# Patient Record
Sex: FEMALE | Race: WHITE | NOT HISPANIC OR LATINO | Employment: OTHER | URBAN - METROPOLITAN AREA
[De-identification: names, ages, dates, MRNs, and addresses within clinical notes are randomized per-mention and may not be internally consistent; named-entity substitution may affect disease eponyms.]

---

## 2017-08-08 ENCOUNTER — OFFICE VISIT (OUTPATIENT)
Dept: INTERNAL MEDICINE | Facility: CLINIC | Age: 72
End: 2017-08-08

## 2017-08-08 VITALS
TEMPERATURE: 98.8 F | HEIGHT: 67 IN | OXYGEN SATURATION: 97 % | BODY MASS INDEX: 24.8 KG/M2 | WEIGHT: 158 LBS | DIASTOLIC BLOOD PRESSURE: 72 MMHG | SYSTOLIC BLOOD PRESSURE: 118 MMHG | HEART RATE: 72 BPM

## 2017-08-08 DIAGNOSIS — E78.2 MIXED HYPERLIPIDEMIA: Primary | ICD-10-CM

## 2017-08-08 DIAGNOSIS — Z12.31 ENCOUNTER FOR SCREENING MAMMOGRAM FOR MALIGNANT NEOPLASM OF BREAST: ICD-10-CM

## 2017-08-08 DIAGNOSIS — Z12.39 BREAST CANCER SCREENING: ICD-10-CM

## 2017-08-08 DIAGNOSIS — M81.0 OP (OSTEOPOROSIS): ICD-10-CM

## 2017-08-08 DIAGNOSIS — E55.9 AVITAMINOSIS D: ICD-10-CM

## 2017-08-08 PROCEDURE — 99214 OFFICE O/P EST MOD 30 MIN: CPT | Performed by: FAMILY MEDICINE

## 2017-08-08 PROCEDURE — G0438 PPPS, INITIAL VISIT: HCPCS | Performed by: FAMILY MEDICINE

## 2017-08-08 RX ORDER — DESONIDE 0.5 MG/G
CREAM TOPICAL 2 TIMES DAILY
Qty: 1 EACH | Refills: 0 | Status: SHIPPED | OUTPATIENT
Start: 2017-08-08 | End: 2018-02-27

## 2017-08-08 RX ORDER — DESONIDE 0.5 MG/G
CREAM TOPICAL 2 TIMES DAILY
COMMUNITY
End: 2017-08-08 | Stop reason: SDUPTHER

## 2017-08-11 ENCOUNTER — TELEPHONE (OUTPATIENT)
Dept: INTERNAL MEDICINE | Facility: CLINIC | Age: 72
End: 2017-08-11

## 2017-08-11 LAB
25(OH)D3+25(OH)D2 SERPL-MCNC: 70 NG/ML (ref 30–100)
ALBUMIN SERPL-MCNC: 4 G/DL (ref 3.5–5.2)
ALBUMIN/GLOB SERPL: 1.5 G/DL
ALP SERPL-CCNC: 78 U/L (ref 39–117)
ALT SERPL-CCNC: 23 U/L (ref 1–33)
AST SERPL-CCNC: 24 U/L (ref 1–32)
BILIRUB SERPL-MCNC: 0.5 MG/DL (ref 0.1–1.2)
BUN SERPL-MCNC: 22 MG/DL (ref 8–23)
BUN/CREAT SERPL: 25.3 (ref 7–25)
CALCIUM SERPL-MCNC: 9.4 MG/DL (ref 8.6–10.5)
CHLORIDE SERPL-SCNC: 105 MMOL/L (ref 98–107)
CHOLEST SERPL-MCNC: 276 MG/DL (ref 0–200)
CO2 SERPL-SCNC: 24.8 MMOL/L (ref 22–29)
CREAT SERPL-MCNC: 0.87 MG/DL (ref 0.57–1)
GLOBULIN SER CALC-MCNC: 2.6 GM/DL
GLUCOSE SERPL-MCNC: 93 MG/DL (ref 65–99)
HDLC SERPL-MCNC: 42 MG/DL (ref 40–60)
LDLC SERPL CALC-MCNC: 186 MG/DL (ref 0–100)
POTASSIUM SERPL-SCNC: 4.2 MMOL/L (ref 3.5–5.2)
PROT SERPL-MCNC: 6.6 G/DL (ref 6–8.5)
SODIUM SERPL-SCNC: 141 MMOL/L (ref 136–145)
TRIGL SERPL-MCNC: 238 MG/DL (ref 0–150)
VLDLC SERPL CALC-MCNC: 47.6 MG/DL (ref 5–40)

## 2017-08-11 NOTE — TELEPHONE ENCOUNTER
----- Message from Octavio Mixon MD sent at 8/11/2017  7:44 AM EDT -----  Elevated cholesterol recommend starting Lipitor 20 mg daily follow low-fat low-cholesterol diet and recheck fasting lipid profile, AST, ALT 3 months

## 2017-08-11 NOTE — TELEPHONE ENCOUNTER
Patient notified.  Patient stated that she did not want to take medication for this.  She will call the office if she changes her mind.  Dr. Mixon notified.

## 2017-08-22 ENCOUNTER — TELEPHONE (OUTPATIENT)
Dept: INTERNAL MEDICINE | Facility: CLINIC | Age: 72
End: 2017-08-22

## 2017-08-22 DIAGNOSIS — Z12.11 ENCOUNTER FOR SCREENING COLONOSCOPY: Primary | ICD-10-CM

## 2017-08-22 NOTE — TELEPHONE ENCOUNTER
Patient called stating that she was to be referred to GI - she has not heard anything from their office.  Please advise.

## 2017-08-23 NOTE — PROGRESS NOTES
Angelina Harris is a 72 y.o. female.  Seen 08/08/2017    Assessment/Plan   Problem List Items Addressed This Visit        Cardiovascular and Mediastinum    HLD (hyperlipidemia) - Primary    Relevant Orders    Lipid Panel (Completed)       Digestive    Avitaminosis D    Relevant Orders    Comprehensive Metabolic Panel (Completed)       Musculoskeletal and Integument    OP (osteoporosis)    Relevant Orders    Vitamin D 25 Hydroxy (Completed)    Comprehensive Metabolic Panel (Completed)           Follow-up results of blood work continue present management of chronic medical problems adjustments as dictated by labs  Subjective     Chief Complaint   Patient presents with   • Inital Medicare Wellness     Patient is requesting refill on desonide 0.05% cream   osteoporosis vitamin D deficiency hyperlipidemia  Social History   Substance Use Topics   • Smoking status: Never Smoker   • Smokeless tobacco: Never Used   • Alcohol use No       History of Present Illness   Patient comes in for follow-up of chronic medical problems as listed above with vitamin D deficiency hyperlipidemia and osteoporosis.  She presently has no acute concerns although needs laboratory evaluation of chronic issues she uses DesOwen steroid cream intermittently for eczematous patches should like a refill on that  The following portions of the patient's history were reviewed and updated as appropriate:PMHroutine: Social history , Past Medical History, Allergies, Current Medications, Active Problem List and Health Maintenance    Review of Systems   Constitutional: Negative for activity change, appetite change and unexpected weight change.   HENT: Negative for nosebleeds and trouble swallowing.    Eyes: Negative for pain and visual disturbance.   Respiratory: Negative for chest tightness, shortness of breath and wheezing.    Cardiovascular: Negative for chest pain and palpitations.   Gastrointestinal: Negative for abdominal pain and blood in stool.  "  Endocrine: Negative.    Genitourinary: Negative for difficulty urinating and hematuria.   Musculoskeletal: Negative for joint swelling.   Skin: Negative for color change and rash.   Allergic/Immunologic: Negative.    Neurological: Negative for syncope and speech difficulty.   Hematological: Negative for adenopathy.   Psychiatric/Behavioral: Negative for agitation and confusion.   All other systems reviewed and are negative.      Objective   Vitals:    08/08/17 1359   BP: 118/72   BP Location: Right arm   Patient Position: Sitting   Cuff Size: Adult   Pulse: 72   Temp: 98.8 °F (37.1 °C)   TempSrc: Oral   SpO2: 97%   Weight: 158 lb (71.7 kg)   Height: 67\" (170.2 cm)     Body mass index is 24.75 kg/(m^2).  Physical Exam   Constitutional: She is oriented to person, place, and time. She appears well-developed and well-nourished. No distress.   HENT:   Head: Normocephalic and atraumatic.   Eyes: Conjunctivae and EOM are normal. Pupils are equal, round, and reactive to light. Right eye exhibits no discharge. Left eye exhibits no discharge. No scleral icterus.   Neck: Normal range of motion. Neck supple. No tracheal deviation present. No thyromegaly present.   Cardiovascular: Normal rate, regular rhythm, normal heart sounds, intact distal pulses and normal pulses.  Exam reveals no gallop.    No murmur heard.  Pulmonary/Chest: Effort normal and breath sounds normal. No respiratory distress. She has no wheezes. She has no rales.   Musculoskeletal: Normal range of motion.   Neurological: She is alert and oriented to person, place, and time. She exhibits normal muscle tone. Coordination normal.   Skin: Skin is warm. No rash noted. No erythema. No pallor.   Psychiatric: She has a normal mood and affect. Her behavior is normal. Judgment and thought content normal.   Nursing note and vitals reviewed.    Reviewed Data:  Office Visit on 08/08/2017   Component Date Value Ref Range Status   • 25 Hydroxy, Vitamin D 08/10/2017 70.0  " 30.0 - 100.0 ng/mL Final    Comment: Reference Range for Total Vitamin D 25(OH)  Deficiency    <20.0 ng/mL  Insufficiency 21-29 ng/mL  Sufficiency    ng/mL  Toxicity      >100 ng/ml        • Glucose 08/10/2017 93  65 - 99 mg/dL Final   • BUN 08/10/2017 22  8 - 23 mg/dL Final   • Creatinine 08/10/2017 0.87  0.57 - 1.00 mg/dL Final   • eGFR Non  Am 08/10/2017 64  >60 mL/min/1.73 Final    Comment: The MDRD GFR formula is only valid for adults with stable  renal function between ages 18 and 70.     • eGFR  Am 08/10/2017 77  >60 mL/min/1.73 Final   • BUN/Creatinine Ratio 08/10/2017 25.3* 7.0 - 25.0 Final   • Sodium 08/10/2017 141  136 - 145 mmol/L Final   • Potassium 08/10/2017 4.2  3.5 - 5.2 mmol/L Final   • Chloride 08/10/2017 105  98 - 107 mmol/L Final   • Total CO2 08/10/2017 24.8  22.0 - 29.0 mmol/L Final   • Calcium 08/10/2017 9.4  8.6 - 10.5 mg/dL Final   • Total Protein 08/10/2017 6.6  6.0 - 8.5 g/dL Final   • Albumin 08/10/2017 4.00  3.50 - 5.20 g/dL Final   • Globulin 08/10/2017 2.6  gm/dL Final   • A/G Ratio 08/10/2017 1.5  g/dL Final   • Total Bilirubin 08/10/2017 0.5  0.1 - 1.2 mg/dL Final   • Alkaline Phosphatase 08/10/2017 78  39 - 117 U/L Final   • AST (SGOT) 08/10/2017 24  1 - 32 U/L Final   • ALT (SGPT) 08/10/2017 23  1 - 33 U/L Final   • Total Cholesterol 08/10/2017 276* 0 - 200 mg/dL Final   • Triglycerides 08/10/2017 238* 0 - 150 mg/dL Final   • HDL Cholesterol 08/10/2017 42  40 - 60 mg/dL Final   • VLDL Cholesterol 08/10/2017 47.6* 5 - 40 mg/dL Final   • LDL Cholesterol  08/10/2017 186* 0 - 100 mg/dL Final

## 2017-08-23 NOTE — PROGRESS NOTES
QUICK REFERENCE INFORMATION:  The ABCs of the Annual Wellness Visit    Initial Medicare Wellness Visit    HEALTH RISK ASSESSMENT    1945    Recent Hospitalizations:  No hospitalization(s) within the last year..        Current Medical Providers:  Patient Care Team:  Octavio Mixon MD as PCP - General (Family Medicine)        Smoking Status:  History   Smoking Status   • Never Smoker   Smokeless Tobacco   • Never Used       Alcohol Consumption:  History   Alcohol Use No       Depression Screen:   PHQ-9 Depression Screening 8/8/2017   Little interest or pleasure in doing things 0   Feeling down, depressed, or hopeless 0   Trouble falling or staying asleep, or sleeping too much 0   Feeling tired or having little energy 1   Poor appetite or overeating 0   Feeling bad about yourself - or that you are a failure or have let yourself or your family down 0   Trouble concentrating on things, such as reading the newspaper or watching television 0   Moving or speaking so slowly that other people could have noticed. Or the opposite - being so fidgety or restless that you have been moving around a lot more than usual 0   Thoughts that you would be better off dead, or of hurting yourself in some way 0   PHQ-9 Total Score 1   If you checked off any problems, how difficult have these problems made it for you to do your work, take care of things at home, or get along with other people? Not difficult at all       Health Habits and Functional and Cognitive Screening:  Functional & Cognitive Status 8/8/2017   Do you have difficulty preparing food and eating? No   Do you have difficulty bathing yourself? No   Do you have difficulty getting dressed? No   Do you have difficulty using the toilet? No   Do you have difficulty moving around from place to place? No   In the past year have you fallen or experienced a near fall? No   Do you need help using the phone?  No   Are you deaf or do you have serious difficulty hearing?  No   Do you  need help with transportation? No   Do you need help shopping? No   Do you need help preparing meals?  No   Do you need help with housework?  No   Do you need help with laundry? No   Do you need help taking your medications? No   Do you need help managing money? No   Do you have difficulty concentrating, remembering or making decisions? Yes       Health Habits  Current Diet: Well Balanced Diet  Dental Exam: Up to date  Eye Exam: Up to date  Exercise (times per week): 7 times per week  Current Exercise Activities Include: Walking          Does the patient have evidence of cognitive impairment? No    Asiprin use counseling: Does not need AS but is currently taking (discussed benefits vs risks and patient elects to stay on ASA)      Recent Lab Results:    Visual Acuity:  No exam data present    Age-appropriate Screening Schedule:  Refer to the list below for future screening recommendations based on patient's age, sex and/or medical conditions. Orders for these recommended tests are listed in the plan section. The patient has been provided with a written plan.    Health Maintenance   Topic Date Due   • TDAP/TD VACCINES (1 - Tdap) 08/07/1962   • PNEUMOCOCCAL VACCINES (65+ LOW/MEDIUM RISK) (1 of 2 - PCV13) 08/07/2008   • MAMMOGRAM  04/15/2016   • COLONOSCOPY  04/15/2016   • ZOSTER VACCINE  04/15/2016   • DXA SCAN  08/12/2016   • INFLUENZA VACCINE  09/01/2017   • LIPID PANEL  08/10/2018        Subjective   History of Present Illness    Angelina Harris is a 72 y.o. female who presents for an Annual Wellness Visit.    The following portions of the patient's history were reviewed and updated as appropriate: allergies, current medications, past family history, past medical history, past social history, past surgical history and problem list.    Outpatient Medications Prior to Visit   Medication Sig Dispense Refill   • Calcium Carbonate-Vitamin D (CALCIUM 500 + D PO) Take 1 tablet by mouth daily.     • Cholecalciferol (VITAMIN D)  "1000 UNITS capsule Take 1 capsule by mouth 2 (two) times a day.     • Flaxseed, Linseed, (FLAX SEED OIL PO) Take 1 capsule by mouth 2 (two) times a day.     • Glucosamine-Chondroit-Vit C-Mn (GLUCOSAMINE-CHONDROITIN) tablet Take 1 tablet by mouth 2 (two) times a day.     • Ibuprofen 200 MG capsule Take 2 capsules by mouth Daily.     • Multiple Vitamin (MULTIVITAMIN) capsule Take 1 capsule by mouth daily.     • Psyllium 30.9 % powder Take 1 packet by mouth daily.     • amoxicillin (AMOXIL) 250 MG capsule Take 250 mg by mouth 3 (Three) Times a Day As Needed.     • aspirin 81 MG EC tablet Take 81 mg by mouth daily.       No facility-administered medications prior to visit.        Patient Active Problem List   Diagnosis   • Awareness of heartbeats   • HLD (hyperlipidemia)   • OP (osteoporosis)   • Avitaminosis D   • Plantar fascia syndrome   • Paronychia of second finger of left hand   • Acute pharyngitis       Advance Care Planning:  has an advance directive - a copy HAS NOT been provided. Have asked the patient to send this to us to add to record.    Identification of Risk Factors:  Risk factors include: cardiovascular risk.    Review of Systems    Compared to one year ago, the patient feels her physical health is the same.  Compared to one year ago, the patient feels her mental health is the same.    Objective     Physical Exam    Vitals:    08/08/17 1359   BP: 118/72   BP Location: Right arm   Patient Position: Sitting   Cuff Size: Adult   Pulse: 72   Temp: 98.8 °F (37.1 °C)   TempSrc: Oral   SpO2: 97%   Weight: 158 lb (71.7 kg)   Height: 67\" (170.2 cm)   PainSc:   2       Body mass index is 24.75 kg/(m^2).  Discussed the patient's BMI with her. The BMI is in the acceptable range.    Assessment/Plan   Patient Self-Management and Personalized Health Advice  The patient has been provided with information about: exercise and supplements and preventive services including:   · Pneumococcal vaccine , Screening mammography, " referral placed, TdaP vaccine, Zostavax vaccine (Herpes Zoster).    Visit Diagnoses:    ICD-10-CM ICD-9-CM   1. Mixed hyperlipidemia E78.2 272.2   2. Avitaminosis D E55.9 268.9   3. OP (osteoporosis) M81.0 733.00   4. Breast cancer screening Z12.39 V76.10   5. Encounter for screening mammogram for malignant neoplasm of breast  Z12.31 V76.12       Orders Placed This Encounter   Procedures   • Mammo Screening Bilateral With CAD     Standing Status:   Future     Standing Expiration Date:   8/24/2018     Order Specific Question:   Reason for Exam:     Answer:   screen   • Vitamin D 25 Hydroxy     Order Specific Question:   [facility] Has the patient fasted?     Answer:   Yes   • Comprehensive Metabolic Panel     Order Specific Question:   [facility] Has the patient fasted?     Answer:   Yes   • Lipid Panel     Order Specific Question:   [facility] Has the patient fasted?     Answer:   Yes       Outpatient Encounter Prescriptions as of 8/8/2017   Medication Sig Dispense Refill   • Calcium Carbonate-Vitamin D (CALCIUM 500 + D PO) Take 1 tablet by mouth daily.     • Cholecalciferol (VITAMIN D) 1000 UNITS capsule Take 1 capsule by mouth 2 (two) times a day.     • desonide (DESOWEN) 0.05 % cream Apply  topically 2 (Two) Times a Day. 1 each 0   • Flaxseed, Linseed, (FLAX SEED OIL PO) Take 1 capsule by mouth 2 (two) times a day.     • Glucosamine-Chondroit-Vit C-Mn (GLUCOSAMINE-CHONDROITIN) tablet Take 1 tablet by mouth 2 (two) times a day.     • Ibuprofen 200 MG capsule Take 2 capsules by mouth Daily.     • Multiple Vitamin (MULTIVITAMIN) capsule Take 1 capsule by mouth daily.     • Psyllium 30.9 % powder Take 1 packet by mouth daily.     • [DISCONTINUED] desonide (DESOWEN) 0.05 % cream Apply  topically 2 (Two) Times a Day.     • [DISCONTINUED] amoxicillin (AMOXIL) 250 MG capsule Take 250 mg by mouth 3 (Three) Times a Day As Needed.     • [DISCONTINUED] aspirin 81 MG EC tablet Take 81 mg by mouth daily.       No facility-administered  encounter medications on file as of 8/8/2017.        Reviewed use of high risk medication in the elderly: yes  Reviewed for potential of harmful drug interactions in the elderly: yes    Follow Up:  Results of screening test.  From C4 10 minutes shingles and pneumonia vaccines    An After Visit Summary and PPPS with all of these plans were given to the patient.

## 2017-08-29 ENCOUNTER — HOSPITAL ENCOUNTER (OUTPATIENT)
Dept: MAMMOGRAPHY | Facility: HOSPITAL | Age: 72
Discharge: HOME | End: 2017-08-29
Admitting: FAMILY MEDICINE

## 2017-08-29 DIAGNOSIS — Z12.31 ENCOUNTER FOR SCREENING MAMMOGRAM FOR MALIGNANT NEOPLASM OF BREAST: ICD-10-CM

## 2017-08-29 DIAGNOSIS — Z12.39 BREAST CANCER SCREENING: ICD-10-CM

## 2017-08-29 PROCEDURE — G0202 SCR MAMMO BI INCL CAD: HCPCS

## 2017-09-05 ENCOUNTER — TELEPHONE (OUTPATIENT)
Dept: INTERNAL MEDICINE | Facility: CLINIC | Age: 72
End: 2017-09-05

## 2017-09-05 NOTE — TELEPHONE ENCOUNTER
----- Message from Octavio Mixon MD sent at 9/5/2017  4:20 PM EDT -----  Mammogram negative malignancy

## 2017-09-14 ENCOUNTER — TRANSCRIBE ORDERS (OUTPATIENT)
Dept: GASTROENTEROLOGY | Facility: CLINIC | Age: 72
End: 2017-09-14

## 2017-09-14 DIAGNOSIS — Z80.0 FAMILY HISTORY OF GI MALIGNANCY: ICD-10-CM

## 2017-09-14 DIAGNOSIS — Z83.71 FAMILY HISTORY OF POLYPS IN THE COLON: Primary | ICD-10-CM

## 2017-09-14 DIAGNOSIS — Z12.11 ENCOUNTER FOR SCREENING FOR MALIGNANT NEOPLASM OF COLON: ICD-10-CM

## 2017-10-23 ENCOUNTER — OUTSIDE FACILITY SERVICE (OUTPATIENT)
Dept: GASTROENTEROLOGY | Facility: CLINIC | Age: 72
End: 2017-10-23

## 2017-10-23 PROCEDURE — 45380 COLONOSCOPY AND BIOPSY: CPT | Performed by: INTERNAL MEDICINE

## 2017-10-31 ENCOUNTER — TELEPHONE (OUTPATIENT)
Dept: GASTROENTEROLOGY | Facility: CLINIC | Age: 72
End: 2017-10-31

## 2017-10-31 NOTE — TELEPHONE ENCOUNTER
Call to pt.  Advise per path report that first polyp that was removed was inflammatory polyp.  Second polyp was not cancerous, but precancerous.    Advise per Dr Aguilar that recommend f/u c/s in 3 yrs, office f/u sooner as needed.  PT verb understanding.

## 2017-10-31 NOTE — TELEPHONE ENCOUNTER
----- Message from Alvarado LAN MD sent at 10/30/2017  5:05 PM EDT -----  Regarding: RE: Path / Procedure  Okay to call results, recommend follow-up colonoscopy in 3 years, office follow up sooner as needed.  ----- Message -----     From: Beto Rodriguez     Sent: 10/30/2017   1:25 PM       To: Alvarado LAN MD  Subject: Path / Procedure                                 Scanned in

## 2018-02-27 ENCOUNTER — OFFICE VISIT (OUTPATIENT)
Dept: INTERNAL MEDICINE | Facility: CLINIC | Age: 73
End: 2018-02-27

## 2018-02-27 VITALS
HEART RATE: 74 BPM | OXYGEN SATURATION: 94 % | SYSTOLIC BLOOD PRESSURE: 126 MMHG | BODY MASS INDEX: 24.2 KG/M2 | DIASTOLIC BLOOD PRESSURE: 74 MMHG | TEMPERATURE: 98.2 F | WEIGHT: 154.2 LBS | HEIGHT: 67 IN

## 2018-02-27 DIAGNOSIS — D36.9 ADENOMATOUS POLYP: ICD-10-CM

## 2018-02-27 DIAGNOSIS — R55 NEAR SYNCOPE: ICD-10-CM

## 2018-02-27 DIAGNOSIS — R68.89 OTHER GENERAL SYMPTOMS AND SIGNS: ICD-10-CM

## 2018-02-27 DIAGNOSIS — R53.83 OTHER FATIGUE: ICD-10-CM

## 2018-02-27 DIAGNOSIS — E78.2 MIXED HYPERLIPIDEMIA: Primary | ICD-10-CM

## 2018-02-27 PROBLEM — R49.0 DYSPHONIA: Status: ACTIVE | Noted: 2018-02-27

## 2018-02-27 PROCEDURE — 99214 OFFICE O/P EST MOD 30 MIN: CPT | Performed by: FAMILY MEDICINE

## 2018-02-27 RX ORDER — VITAMIN B COMPLEX
1 CAPSULE ORAL DAILY
COMMUNITY
End: 2018-12-13 | Stop reason: SDUPTHER

## 2018-02-27 NOTE — PROGRESS NOTES
"Angelina Harris is a 72 y.o. female.      Assessment/Plan   Problem List Items Addressed This Visit        Cardiovascular and Mediastinum    HLD (hyperlipidemia) - Primary    Near syncope    Relevant Orders    CBC & Differential    T4, Free    TSH    Methylmalonic Acid, Serum    Holter monitor - 48 hour       Other    Adenomatous polyp    Overview     Colonoscopy 10/2017 Dr. Aguilar           Other Visit Diagnoses     Other general symptoms and signs         Relevant Orders    T4, Free    Other fatigue         Relevant Orders    TSH           Patient with near syncope will follow-up results of blood work and results of Holter monitor she has recommended a follow-up with Dr. Newby for adenomatous polyps repeat colonoscopy in 3 years  She will start Pneumonia vaccine series next visit    Chief Complaint   Patient presents with   • has been having \"spells\" where everything goes black     concern for anemia or blood pressure issues   Colon polyp hyperlipidemia  Social History   Substance Use Topics   • Smoking status: Never Smoker   • Smokeless tobacco: Never Used   • Alcohol use No       History of Present Illness   Patient comes in with a worsening problem of symptoms where she feels that she is going to faint and everything is going black but then recovers this happens almost daily for at least 3 seconds doesn'tspecifically related to any activity or eating or time of day she has not fallen but is concerned because the frequency.  She had adenomatous polyp removed from her colon in October 2017 she's can have follow-up colonoscopy in 3 years Dr. Aguilar she has not had EGD at this point she does have hyperlipidemia but does not want take any statin therapy  The following portions of the patient's history were reviewed and updated as appropriate:PMHroutine: Social history , Past Medical History, Allergies, Current Medications, Active Problem List, Family History and Health Maintenance    Review of Systems " "  Constitutional: Negative for activity change, appetite change and unexpected weight change.   HENT: Negative for nosebleeds and trouble swallowing.    Eyes: Negative for pain and visual disturbance.   Respiratory: Negative for chest tightness, shortness of breath and wheezing.    Cardiovascular: Negative for chest pain and palpitations.   Gastrointestinal: Negative for abdominal pain and blood in stool.   Endocrine: Negative.    Genitourinary: Negative for difficulty urinating and hematuria.   Musculoskeletal: Negative for joint swelling.   Skin: Negative for color change and rash.   Allergic/Immunologic: Negative.    Neurological: Negative for syncope and speech difficulty.   Hematological: Negative for adenopathy.   Psychiatric/Behavioral: Negative for agitation and confusion.   All other systems reviewed and are negative.      Objective   Vitals:    02/27/18 1438   BP: 126/74   BP Location: Right arm   Patient Position: Sitting   Cuff Size: Adult   Pulse: 74   Temp: 98.2 °F (36.8 °C)   TempSrc: Oral   SpO2: 94%   Weight: 69.9 kg (154 lb 3.2 oz)   Height: 170.2 cm (67\")     Body mass index is 24.15 kg/(m^2).  Physical Exam   Constitutional: She is oriented to person, place, and time. She appears well-developed and well-nourished.   HENT:   Head: Normocephalic and atraumatic.   Eyes: Conjunctivae are normal. Pupils are equal, round, and reactive to light.   Neck: No thyromegaly present.   Cardiovascular: Normal rate and regular rhythm.    Pulmonary/Chest: Effort normal and breath sounds normal.   Musculoskeletal: Normal range of motion. She exhibits no edema or tenderness.   Neurological: She is oriented to person, place, and time.   Psychiatric: She has a normal mood and affect. Her behavior is normal. Judgment and thought content normal.   Nursing note and vitals reviewed.    Reviewed Data:  No visits with results within 1 Month(s) from this visit.  Latest known visit with results is:    Office Visit on " 08/08/2017   Component Date Value Ref Range Status   • 25 Hydroxy, Vitamin D 08/10/2017 70.0  30.0 - 100.0 ng/mL Final    Comment: Reference Range for Total Vitamin D 25(OH)  Deficiency    <20.0 ng/mL  Insufficiency 21-29 ng/mL  Sufficiency    ng/mL  Toxicity      >100 ng/ml        • Glucose 08/10/2017 93  65 - 99 mg/dL Final   • BUN 08/10/2017 22  8 - 23 mg/dL Final   • Creatinine 08/10/2017 0.87  0.57 - 1.00 mg/dL Final   • eGFR Non  Am 08/10/2017 64  >60 mL/min/1.73 Final    Comment: The MDRD GFR formula is only valid for adults with stable  renal function between ages 18 and 70.     • eGFR  Am 08/10/2017 77  >60 mL/min/1.73 Final   • BUN/Creatinine Ratio 08/10/2017 25.3* 7.0 - 25.0 Final   • Sodium 08/10/2017 141  136 - 145 mmol/L Final   • Potassium 08/10/2017 4.2  3.5 - 5.2 mmol/L Final   • Chloride 08/10/2017 105  98 - 107 mmol/L Final   • Total CO2 08/10/2017 24.8  22.0 - 29.0 mmol/L Final   • Calcium 08/10/2017 9.4  8.6 - 10.5 mg/dL Final   • Total Protein 08/10/2017 6.6  6.0 - 8.5 g/dL Final   • Albumin 08/10/2017 4.00  3.50 - 5.20 g/dL Final   • Globulin 08/10/2017 2.6  gm/dL Final   • A/G Ratio 08/10/2017 1.5  g/dL Final   • Total Bilirubin 08/10/2017 0.5  0.1 - 1.2 mg/dL Final   • Alkaline Phosphatase 08/10/2017 78  39 - 117 U/L Final   • AST (SGOT) 08/10/2017 24  1 - 32 U/L Final   • ALT (SGPT) 08/10/2017 23  1 - 33 U/L Final   • Total Cholesterol 08/10/2017 276* 0 - 200 mg/dL Final   • Triglycerides 08/10/2017 238* 0 - 150 mg/dL Final   • HDL Cholesterol 08/10/2017 42  40 - 60 mg/dL Final   • VLDL Cholesterol 08/10/2017 47.6* 5 - 40 mg/dL Final   • LDL Cholesterol  08/10/2017 186* 0 - 100 mg/dL Final

## 2018-03-02 ENCOUNTER — TELEPHONE (OUTPATIENT)
Dept: INTERNAL MEDICINE | Facility: CLINIC | Age: 73
End: 2018-03-02

## 2018-03-02 LAB
BASOPHILS # BLD AUTO: 0.03 10*3/MM3 (ref 0–0.2)
BASOPHILS NFR BLD AUTO: 0.4 % (ref 0–1.5)
EOSINOPHIL # BLD AUTO: 0.11 10*3/MM3 (ref 0–0.7)
EOSINOPHIL NFR BLD AUTO: 1.5 % (ref 0.3–6.2)
ERYTHROCYTE [DISTWIDTH] IN BLOOD BY AUTOMATED COUNT: 13.1 % (ref 11.7–13)
HCT VFR BLD AUTO: 41.5 % (ref 35.6–45.5)
HGB BLD-MCNC: 13.6 G/DL (ref 11.9–15.5)
IMM GRANULOCYTES # BLD: 0 10*3/MM3 (ref 0–0.03)
IMM GRANULOCYTES NFR BLD: 0 % (ref 0–0.5)
LYMPHOCYTES # BLD AUTO: 1.6 10*3/MM3 (ref 0.9–4.8)
LYMPHOCYTES NFR BLD AUTO: 22 % (ref 19.6–45.3)
MCH RBC QN AUTO: 31.4 PG (ref 26.9–32)
MCHC RBC AUTO-ENTMCNC: 32.8 G/DL (ref 32.4–36.3)
MCV RBC AUTO: 95.8 FL (ref 80.5–98.2)
METHYLMALONATE SERPL-SCNC: 202 NMOL/L (ref 0–378)
MONOCYTES # BLD AUTO: 0.68 10*3/MM3 (ref 0.2–1.2)
MONOCYTES NFR BLD AUTO: 9.4 % (ref 5–12)
NEUTROPHILS # BLD AUTO: 4.84 10*3/MM3 (ref 1.9–8.1)
NEUTROPHILS NFR BLD AUTO: 66.7 % (ref 42.7–76)
PLATELET # BLD AUTO: 187 10*3/MM3 (ref 140–500)
RBC # BLD AUTO: 4.33 10*6/MM3 (ref 3.9–5.2)
T4 FREE SERPL-MCNC: 1.15 NG/DL (ref 0.93–1.7)
TSH SERPL DL<=0.005 MIU/L-ACNC: 2.65 MIU/ML (ref 0.27–4.2)
WBC # BLD AUTO: 7.26 10*3/MM3 (ref 4.5–10.7)

## 2018-03-19 ENCOUNTER — TELEPHONE (OUTPATIENT)
Dept: INTERNAL MEDICINE | Facility: CLINIC | Age: 73
End: 2018-03-19

## 2018-03-19 NOTE — TELEPHONE ENCOUNTER
Patient notified.  Patient said that she has seen Dr. Simms before and she will call to schedule her own appointment.

## 2018-03-19 NOTE — TELEPHONE ENCOUNTER
Consult cardiology for  irregular heartbeats on the Holter monitor however they don't seem to coincide with any dizziness.

## 2018-03-20 ENCOUNTER — OFFICE VISIT (OUTPATIENT)
Dept: CARDIOLOGY | Facility: CLINIC | Age: 73
End: 2018-03-20

## 2018-03-20 ENCOUNTER — LAB (OUTPATIENT)
Dept: LAB | Facility: HOSPITAL | Age: 73
End: 2018-03-20

## 2018-03-20 VITALS
BODY MASS INDEX: 23.64 KG/M2 | DIASTOLIC BLOOD PRESSURE: 82 MMHG | HEART RATE: 61 BPM | WEIGHT: 156 LBS | HEIGHT: 68 IN | SYSTOLIC BLOOD PRESSURE: 128 MMHG

## 2018-03-20 DIAGNOSIS — R00.2 AWARENESS OF HEARTBEATS: ICD-10-CM

## 2018-03-20 DIAGNOSIS — R55 NEAR SYNCOPE: Primary | ICD-10-CM

## 2018-03-20 DIAGNOSIS — E78.2 MIXED HYPERLIPIDEMIA: ICD-10-CM

## 2018-03-20 DIAGNOSIS — I47.1 ATRIAL TACHYCARDIA (HCC): ICD-10-CM

## 2018-03-20 PROBLEM — I47.19 ATRIAL TACHYCARDIA: Status: ACTIVE | Noted: 2018-03-20

## 2018-03-20 LAB
ANION GAP SERPL CALCULATED.3IONS-SCNC: 12.4 MMOL/L
BUN BLD-MCNC: 22 MG/DL (ref 8–23)
BUN/CREAT SERPL: 26.8 (ref 7–25)
CALCIUM SPEC-SCNC: 9.6 MG/DL (ref 8.6–10.5)
CHLORIDE SERPL-SCNC: 100 MMOL/L (ref 98–107)
CO2 SERPL-SCNC: 27.6 MMOL/L (ref 22–29)
CREAT BLD-MCNC: 0.82 MG/DL (ref 0.57–1)
GFR SERPL CREATININE-BSD FRML MDRD: 68 ML/MIN/1.73
GLUCOSE BLD-MCNC: 101 MG/DL (ref 65–99)
POTASSIUM BLD-SCNC: 4.2 MMOL/L (ref 3.5–5.2)
SODIUM BLD-SCNC: 140 MMOL/L (ref 136–145)

## 2018-03-20 PROCEDURE — 93000 ELECTROCARDIOGRAM COMPLETE: CPT | Performed by: INTERNAL MEDICINE

## 2018-03-20 PROCEDURE — 99204 OFFICE O/P NEW MOD 45 MIN: CPT | Performed by: INTERNAL MEDICINE

## 2018-03-20 PROCEDURE — 80048 BASIC METABOLIC PNL TOTAL CA: CPT

## 2018-03-20 PROCEDURE — 36415 COLL VENOUS BLD VENIPUNCTURE: CPT

## 2018-03-20 NOTE — PROGRESS NOTES
Subjective:     Encounter Date:03/20/2018      Patient ID: Angelina Harris is a 72 y.o. female.    Chief Complaint:  History of Present Illness    This a 72-year-old with a history of recurrent stains, hyperlipidemia, presents for evaluation of an abnormal Holter monitor.    The patient was recently evaluated by Dr. Mixon on 2/27/2018 at which time she reported symptoms of near syncope.  He set her up with a 48 hour Holter monitor at that time that showed occasional unifocal PVCs, occasional atrial ectopy including nonsustained episodes of atrial tachycardia at the longest lasting 16 beats.  She reported symptoms of dizziness with the monitor but this had no correlation with any arrhythmias.    She reports that the symptoms have been going on for a few months now.  She describes the lightheadedness and near syncope occurring mainly when she stands up from a seated position or if she is been standing for a period of time.  She reports that when she stands up from seated position she will usually have to stop and hold onto something for a few seconds to let the symptoms passed.  With the other episodes she notices primarily when she is out walking her dog.  She reports that her dog recently has taken to stopping and sitting for a period of time during the middle of the walk.  While she standing they're waiting for her dog to get up she reports feeling lightheaded.  In order to prevent this she started moving around in pumping her legs up and down which has seemed to help with her symptoms.  She denies any syncope, palpitations, dyspnea, or chest pain associated with near syncope.  Additionally she has intermittent episodes of palpitations which he notices it mainly at nighttime.  She's only had one prolonged episode that lasted about an hour.  Otherwise symptoms are usually rather brief.    In the past she has been followed by Dr. Simms and underwent a workup including a stress echocardiogram in 1/2009 that was  negative for ischemia.  Additionally she had normal left ventricle systolic function wall motion with an EF of 60%, mild mitral, aortic, and tricuspid regurgitation, grade 1 diastolic dysfunction, and an interatrial septal aneurysm but no evidence of a PFO or ASD.      Review of Systems   Constitution: Negative for weakness and malaise/fatigue.   HENT: Negative for hearing loss, hoarse voice, nosebleeds and sore throat.    Eyes: Negative for pain.   Cardiovascular: Positive for near-syncope and palpitations. Negative for chest pain, claudication, cyanosis, dyspnea on exertion, irregular heartbeat, leg swelling, orthopnea, paroxysmal nocturnal dyspnea and syncope.   Respiratory: Negative for shortness of breath and snoring.    Endocrine: Negative for cold intolerance, heat intolerance, polydipsia, polyphagia and polyuria.   Skin: Negative for itching and rash.   Musculoskeletal: Negative for arthritis, falls, joint pain, joint swelling, muscle cramps, muscle weakness and myalgias.   Gastrointestinal: Negative for constipation, diarrhea, dysphagia, heartburn, hematemesis, hematochezia, melena, nausea and vomiting.   Genitourinary: Negative for frequency, hematuria and hesitancy.   Neurological: Positive for light-headedness. Negative for excessive daytime sleepiness, dizziness, headaches and numbness.   Psychiatric/Behavioral: Negative for depression. The patient is not nervous/anxious.           Current Outpatient Prescriptions:   •  ACETAMINOPHEN PO, Take 1 tablet by mouth 2 (Two) Times a Day., Disp: , Rfl:   •  B Complex Vitamins (VITAMIN B COMPLEX) capsule capsule, Take 1 capsule by mouth Daily., Disp: , Rfl:   •  Calcium Carbonate-Vitamin D (CALCIUM 500 + D PO), Take 1 tablet by mouth daily., Disp: , Rfl:   •  Cholecalciferol (VITAMIN D) 1000 UNITS capsule, Take 1 capsule by mouth 2 (two) times a day., Disp: , Rfl:   •  Flaxseed, Linseed, (FLAX SEED OIL PO), Take 1 capsule by mouth 2 (two) times a day., Disp: ,  "Rfl:   •  Glucosamine-Chondroit-Vit C-Mn (GLUCOSAMINE-CHONDROITIN) tablet, Take 1 tablet by mouth 2 (two) times a day., Disp: , Rfl:   •  Lactobacillus (ACIDOPHILUS PO), Take 1 tablet by mouth Daily., Disp: , Rfl:   •  Multiple Vitamin (MULTIVITAMIN) capsule, Take 1 capsule by mouth daily., Disp: , Rfl:   •  Psyllium 30.9 % powder, Take 1 packet by mouth daily., Disp: , Rfl:     Past Medical History:   Diagnosis Date   • Adenomatous polyp    • Fatigue    • Hyperlipidemia    • Near syncope      Past Surgical History:   Procedure Laterality Date   • BREAST EXCISIONAL BIOPSY Right     done prior to 2007; benign   • EYE SURGERY       Family History   Problem Relation Age of Onset   • Atrial fibrillation Mother    • Breast cancer Paternal Aunt      Social History   Substance Use Topics   • Smoking status: Never Smoker   • Smokeless tobacco: Never Used   • Alcohol use No           ECG 12 Lead  Date/Time: 3/20/2018 5:17 PM  Performed by: YVONNE WHITE  Authorized by: YVONNE WHITE   Comparison: compared with previous ECG   Similar to previous ECG  Rhythm: sinus rhythm               Objective:         Visit Vitals  /82 (BP Location: Right arm, Patient Position: Sitting) Comment: lft 122 80   Pulse 61   Ht 172.7 cm (68\")   Wt 70.8 kg (156 lb)   BMI 23.72 kg/m²          Physical Exam   Constitutional: She is oriented to person, place, and time. She appears well-developed and well-nourished.   HENT:   Head: Normocephalic and atraumatic.   Eyes: Conjunctivae, EOM and lids are normal. Pupils are equal, round, and reactive to light.   Neck: Normal range of motion and full passive range of motion without pain. Neck supple. No JVD present. Carotid bruit is not present.   Cardiovascular: Normal rate, regular rhythm, S1 normal and S2 normal.  Exam reveals no gallop.    No murmur heard.  Pulses:       Radial pulses are 2+ on the right side, and 2+ on the left side.   No bilateral lower extremity edema   Pulmonary/Chest: " Effort normal and breath sounds normal.   Abdominal: Soft. Normal appearance.   Lymphadenopathy:     She has no cervical adenopathy.   Neurological: She is alert and oriented to person, place, and time.   Skin: Skin is warm, dry and intact.   Psychiatric: She has a normal mood and affect.       Lab Review:       Assessment:          Diagnosis Plan   1. Near syncope  Adult Transthoracic Echo Complete W/ Cont if Necessary Per Protocol   2. Awareness of heartbeats  Basic Metabolic Panel   3. Mixed hyperlipidemia  Adult Transthoracic Echo Complete W/ Cont if Necessary Per Protocol   4. Atrial tachycardia  Adult Transthoracic Echo Complete W/ Cont if Necessary Per Protocol          Plan:       1.  Near syncope.  Her Holter monitor did not show any arrhythmias that I think responsible for the symptoms.  Her symptoms appear to occur more when she is in a standing position or with position changes which brings to mind more of an orthostatic hypotension issue.  I suspect that her history of varicose veins may be exacerbating this.  We discussed taking her time with position changes and dryness to  one place for prolonged periods of time.  Encouraged her to continue use of compression hose.  We'll go ahead and get an echocardiogram to rule out any structural heart disease.  2.  Atrial tachycardia.  Head short nonsustained episodes on her recent Holter monitor.  This may be responsible for her palpitations but I don't believe it's the cause of her lightheadedness and near-syncope.  Again we are getting a echocardiogram.  The patient is also concerned about her potassium level and whether this is related.  We'll get her set up with a basic metabolic panel today.  3.  Hyperlipidemia.  Followed by Dr. Mixon.    We'll call and discuss results of her lab work and echocardiogram and determine further workup, management, and follow-up based on his results.

## 2018-03-27 ENCOUNTER — HOSPITAL ENCOUNTER (OUTPATIENT)
Dept: CARDIOLOGY | Facility: HOSPITAL | Age: 73
Discharge: HOME | End: 2018-03-27
Attending: INTERNAL MEDICINE | Admitting: INTERNAL MEDICINE

## 2018-03-27 VITALS
HEIGHT: 67 IN | SYSTOLIC BLOOD PRESSURE: 90 MMHG | BODY MASS INDEX: 24.33 KG/M2 | HEART RATE: 70 BPM | WEIGHT: 155 LBS | DIASTOLIC BLOOD PRESSURE: 60 MMHG

## 2018-03-27 LAB
ASCENDING AORTA: 3.4 CM
BH CV ECHO MEAS - ACS: 1.8 CM
BH CV ECHO MEAS - AI DEC SLOPE: 113.1 CM/SEC^2
BH CV ECHO MEAS - AI MAX PG: 50.7 MMHG
BH CV ECHO MEAS - AI MAX VEL: 355.8 CM/SEC
BH CV ECHO MEAS - AI P1/2T: 921.4 MSEC
BH CV ECHO MEAS - AO MAX PG (FULL): 6.9 MMHG
BH CV ECHO MEAS - AO MAX PG: 10.1 MMHG
BH CV ECHO MEAS - AO MEAN PG (FULL): 3.1 MMHG
BH CV ECHO MEAS - AO MEAN PG: 4.7 MMHG
BH CV ECHO MEAS - AO ROOT AREA (BSA CORRECTED): 2
BH CV ECHO MEAS - AO ROOT AREA: 9.9 CM^2
BH CV ECHO MEAS - AO ROOT DIAM: 3.5 CM
BH CV ECHO MEAS - AO V2 MAX: 158.7 CM/SEC
BH CV ECHO MEAS - AO V2 MEAN: 97.1 CM/SEC
BH CV ECHO MEAS - AO V2 VTI: 37.9 CM
BH CV ECHO MEAS - ASC AORTA: 3.4 CM
BH CV ECHO MEAS - AVA(I,A): 1.8 CM^2
BH CV ECHO MEAS - AVA(I,D): 1.8 CM^2
BH CV ECHO MEAS - AVA(V,A): 1.6 CM^2
BH CV ECHO MEAS - AVA(V,D): 1.6 CM^2
BH CV ECHO MEAS - BSA(HAYCOCK): 1.8 M^2
BH CV ECHO MEAS - BSA: 1.8 M^2
BH CV ECHO MEAS - BZI_BMI: 24.3 KILOGRAMS/M^2
BH CV ECHO MEAS - BZI_METRIC_HEIGHT: 170.2 CM
BH CV ECHO MEAS - BZI_METRIC_WEIGHT: 70.3 KG
BH CV ECHO MEAS - CONTRAST EF (2CH): 59 ML/M^2
BH CV ECHO MEAS - CONTRAST EF 4CH: 64.1 ML/M^2
BH CV ECHO MEAS - EDV(MOD-SP2): 61 ML
BH CV ECHO MEAS - EDV(MOD-SP4): 64 ML
BH CV ECHO MEAS - EDV(TEICH): 126.5 ML
BH CV ECHO MEAS - EF(CUBED): 57.2 %
BH CV ECHO MEAS - EF(MOD-SP2): 59 %
BH CV ECHO MEAS - EF(MOD-SP4): 64.1 %
BH CV ECHO MEAS - EF(TEICH): 48.5 %
BH CV ECHO MEAS - ESV(MOD-SP2): 25 ML
BH CV ECHO MEAS - ESV(MOD-SP4): 23 ML
BH CV ECHO MEAS - ESV(TEICH): 65.1 ML
BH CV ECHO MEAS - FS: 24.6 %
BH CV ECHO MEAS - IVS/LVPW: 1
BH CV ECHO MEAS - IVSD: 1.1 CM
BH CV ECHO MEAS - LAT PEAK E' VEL: 9 CM/SEC
BH CV ECHO MEAS - LV DIASTOLIC VOL/BSA (35-75): 35.3 ML/M^2
BH CV ECHO MEAS - LV MASS(C)D: 205.5 GRAMS
BH CV ECHO MEAS - LV MASS(C)DI: 113.2 GRAMS/M^2
BH CV ECHO MEAS - LV MAX PG: 3.1 MMHG
BH CV ECHO MEAS - LV MEAN PG: 1.6 MMHG
BH CV ECHO MEAS - LV SYSTOLIC VOL/BSA (12-30): 12.7 ML/M^2
BH CV ECHO MEAS - LV V1 MAX: 88.7 CM/SEC
BH CV ECHO MEAS - LV V1 MEAN: 57.3 CM/SEC
BH CV ECHO MEAS - LV V1 VTI: 22.6 CM
BH CV ECHO MEAS - LVIDD: 5.1 CM
BH CV ECHO MEAS - LVIDS: 3.9 CM
BH CV ECHO MEAS - LVLD AP2: 7 CM
BH CV ECHO MEAS - LVLD AP4: 6.6 CM
BH CV ECHO MEAS - LVLS AP2: 5.6 CM
BH CV ECHO MEAS - LVLS AP4: 5.7 CM
BH CV ECHO MEAS - LVOT AREA (M): 2.8 CM^2
BH CV ECHO MEAS - LVOT AREA: 2.9 CM^2
BH CV ECHO MEAS - LVOT DIAM: 1.9 CM
BH CV ECHO MEAS - LVPWD: 1 CM
BH CV ECHO MEAS - MED PEAK E' VEL: 8 CM/SEC
BH CV ECHO MEAS - MR MAX PG: 31.1 MMHG
BH CV ECHO MEAS - MR MAX VEL: 279 CM/SEC
BH CV ECHO MEAS - MV A DUR: 0.12 SEC
BH CV ECHO MEAS - MV A MAX VEL: 81 CM/SEC
BH CV ECHO MEAS - MV DEC SLOPE: 252.6 CM/SEC^2
BH CV ECHO MEAS - MV DEC TIME: 0.24 SEC
BH CV ECHO MEAS - MV E MAX VEL: 55.3 CM/SEC
BH CV ECHO MEAS - MV E/A: 0.68
BH CV ECHO MEAS - MV MAX PG: 6.1 MMHG
BH CV ECHO MEAS - MV MEAN PG: 1.9 MMHG
BH CV ECHO MEAS - MV P1/2T MAX VEL: 60.4 CM/SEC
BH CV ECHO MEAS - MV P1/2T: 70.1 MSEC
BH CV ECHO MEAS - MV V2 MAX: 123.5 CM/SEC
BH CV ECHO MEAS - MV V2 MEAN: 59.2 CM/SEC
BH CV ECHO MEAS - MV V2 VTI: 23.1 CM
BH CV ECHO MEAS - MVA P1/2T LCG: 3.6 CM^2
BH CV ECHO MEAS - MVA(P1/2T): 3.1 CM^2
BH CV ECHO MEAS - MVA(VTI): 2.9 CM^2
BH CV ECHO MEAS - PA MAX PG (FULL): 2.1 MMHG
BH CV ECHO MEAS - PA MAX PG: 3.7 MMHG
BH CV ECHO MEAS - PA V2 MAX: 96.4 CM/SEC
BH CV ECHO MEAS - PULM A REVS DUR: 0.12 SEC
BH CV ECHO MEAS - PULM A REVS VEL: 24.7 CM/SEC
BH CV ECHO MEAS - PULM DIAS VEL: 36.4 CM/SEC
BH CV ECHO MEAS - PULM S/D: 1.5
BH CV ECHO MEAS - PULM SYS VEL: 53.4 CM/SEC
BH CV ECHO MEAS - PVA(V,A): 1 CM^2
BH CV ECHO MEAS - PVA(V,D): 1 CM^2
BH CV ECHO MEAS - QP/QS: 0.35
BH CV ECHO MEAS - RAP SYSTOLE: 8 MMHG
BH CV ECHO MEAS - RV MAX PG: 1.6 MMHG
BH CV ECHO MEAS - RV MEAN PG: 0.97 MMHG
BH CV ECHO MEAS - RV V1 MAX: 63.5 CM/SEC
BH CV ECHO MEAS - RV V1 MEAN: 46.6 CM/SEC
BH CV ECHO MEAS - RV V1 VTI: 15.4 CM
BH CV ECHO MEAS - RVOT AREA: 1.5 CM^2
BH CV ECHO MEAS - RVOT DIAM: 1.4 CM
BH CV ECHO MEAS - RVSP: 41 MMHG
BH CV ECHO MEAS - SI(AO): 206.9 ML/M^2
BH CV ECHO MEAS - SI(CUBED): 43 ML/M^2
BH CV ECHO MEAS - SI(LVOT): 36.7 ML/M^2
BH CV ECHO MEAS - SI(MOD-SP2): 19.8 ML/M^2
BH CV ECHO MEAS - SI(MOD-SP4): 22.6 ML/M^2
BH CV ECHO MEAS - SI(TEICH): 33.8 ML/M^2
BH CV ECHO MEAS - SUP REN AO DIAM: 1.6 CM
BH CV ECHO MEAS - SV(AO): 375.3 ML
BH CV ECHO MEAS - SV(CUBED): 78 ML
BH CV ECHO MEAS - SV(LVOT): 66.6 ML
BH CV ECHO MEAS - SV(MOD-SP2): 36 ML
BH CV ECHO MEAS - SV(MOD-SP4): 41 ML
BH CV ECHO MEAS - SV(RVOT): 23.6 ML
BH CV ECHO MEAS - SV(TEICH): 61.4 ML
BH CV ECHO MEAS - TAPSE (>1.6): 2.5 CM2
BH CV ECHO MEAS - TR MAX VEL: 285.7 CM/SEC
BH CV XLRA - RV BASE: 2.4 CM
BH CV XLRA - TDI S': 11 CM/SEC
E/E' RATIO: 7
LEFT ATRIUM VOLUME INDEX: 12 ML/M2
LV EF 2D ECHO EST: 64 %
SINUS: 3.4 CM
STJ: 3 CM

## 2018-03-27 PROCEDURE — 93306 TTE W/DOPPLER COMPLETE: CPT

## 2018-03-27 PROCEDURE — 93306 TTE W/DOPPLER COMPLETE: CPT | Performed by: INTERNAL MEDICINE

## 2018-06-21 ENCOUNTER — OFFICE VISIT (OUTPATIENT)
Dept: INTERNAL MEDICINE | Facility: CLINIC | Age: 73
End: 2018-06-21

## 2018-06-21 VITALS
HEIGHT: 67 IN | SYSTOLIC BLOOD PRESSURE: 121 MMHG | BODY MASS INDEX: 24.28 KG/M2 | DIASTOLIC BLOOD PRESSURE: 72 MMHG | OXYGEN SATURATION: 94 % | RESPIRATION RATE: 20 BRPM | WEIGHT: 154.7 LBS | TEMPERATURE: 97.9 F | HEART RATE: 70 BPM

## 2018-06-21 DIAGNOSIS — H60.501 ACUTE OTITIS EXTERNA OF RIGHT EAR, UNSPECIFIED TYPE: Primary | ICD-10-CM

## 2018-06-21 PROCEDURE — 99213 OFFICE O/P EST LOW 20 MIN: CPT | Performed by: FAMILY MEDICINE

## 2018-06-21 RX ORDER — FLUOCINOLONE ACETONIDE 0.11 MG/ML
OIL AURICULAR (OTIC)
Qty: 20 ML | Refills: 0 | Status: SHIPPED | OUTPATIENT
Start: 2018-06-21 | End: 2018-12-13

## 2018-06-21 NOTE — PROGRESS NOTES
"Angelina Harris is a 72 y.o. female.      Assessment/Plan   Problem List Items Addressed This Visit        Nervous and Auditory    Acute otitis externa of right ear - Primary           Patient will trial steroid oil twice a day for a week if no improvement consult ENT      Chief Complaint   Patient presents with   • Earache     bilateral, right side worse, outerpart of ear tender,  started about 3 weeks ago,      Social History   Substance Use Topics   • Smoking status: Never Smoker   • Smokeless tobacco: Never Used   • Alcohol use No       History of Present Illness   Patient comes in for 3 week history of ear pain initially on the left now on the right improved and on the left with peroxide use persistent on the right she's very sore throat or any head congestion she does suffer from environmental allergies occasionally  The following portions of the patient's history were reviewed and updated as appropriate:PMHroutine: Social history , Past Medical History, Allergies, Current Medications, Active Problem List and Health Maintenance    Review of Systems   Constitutional: Negative.    Eyes: Negative.    Respiratory: Negative.        Objective   Vitals:    06/21/18 1343   BP: 121/72   BP Location: Right arm   Patient Position: Sitting   Cuff Size: Adult   Pulse: 70   Resp: 20   Temp: 97.9 °F (36.6 °C)   SpO2: 94%   Weight: 70.2 kg (154 lb 11.2 oz)   Height: 170.2 cm (67\")     Body mass index is 24.23 kg/m².  Physical Exam   Constitutional: She appears well-developed and well-nourished.   HENT:   Head: Normocephalic and atraumatic.   Right Ear: There is swelling.   Left Ear: External ear normal.   Ears:    Mouth/Throat: Oropharynx is clear and moist.   Eyes: Conjunctivae are normal. Pupils are equal, round, and reactive to light.   Nursing note and vitals reviewed.    Reviewed Data:  No visits with results within 1 Month(s) from this visit.   Latest known visit with results is:   Lab on 03/20/2018   Component Date Value " Ref Range Status   • Glucose 03/20/2018 101* 65 - 99 mg/dL Final   • BUN 03/20/2018 22  8 - 23 mg/dL Final   • Creatinine 03/20/2018 0.82  0.57 - 1.00 mg/dL Final   • Sodium 03/20/2018 140  136 - 145 mmol/L Final   • Potassium 03/20/2018 4.2  3.5 - 5.2 mmol/L Final   • Chloride 03/20/2018 100  98 - 107 mmol/L Final   • CO2 03/20/2018 27.6  22.0 - 29.0 mmol/L Final   • Calcium 03/20/2018 9.6  8.6 - 10.5 mg/dL Final   • eGFR Non  Amer 03/20/2018 68  >60 mL/min/1.73 Final   • BUN/Creatinine Ratio 03/20/2018 26.8* 7.0 - 25.0 Final   • Anion Gap 03/20/2018 12.4  mmol/L Final

## 2018-12-13 ENCOUNTER — OFFICE VISIT (OUTPATIENT)
Dept: INTERNAL MEDICINE | Facility: CLINIC | Age: 73
End: 2018-12-13

## 2018-12-13 VITALS
OXYGEN SATURATION: 100 % | DIASTOLIC BLOOD PRESSURE: 80 MMHG | HEIGHT: 67 IN | SYSTOLIC BLOOD PRESSURE: 130 MMHG | HEART RATE: 69 BPM | BODY MASS INDEX: 24.33 KG/M2 | WEIGHT: 155 LBS | TEMPERATURE: 97.8 F

## 2018-12-13 DIAGNOSIS — K57.30 DIVERTICULOSIS OF LARGE INTESTINE WITHOUT HEMORRHAGE: ICD-10-CM

## 2018-12-13 DIAGNOSIS — R49.0 DYSPHONIA: ICD-10-CM

## 2018-12-13 DIAGNOSIS — Z00.00 MEDICARE ANNUAL WELLNESS VISIT, INITIAL: ICD-10-CM

## 2018-12-13 DIAGNOSIS — R55 NEAR SYNCOPE: ICD-10-CM

## 2018-12-13 DIAGNOSIS — M19.90 ARTHRITIS: Primary | ICD-10-CM

## 2018-12-13 DIAGNOSIS — I47.1 ATRIAL TACHYCARDIA (HCC): ICD-10-CM

## 2018-12-13 PROCEDURE — G0439 PPPS, SUBSEQ VISIT: HCPCS | Performed by: FAMILY MEDICINE

## 2018-12-13 PROCEDURE — 99214 OFFICE O/P EST MOD 30 MIN: CPT | Performed by: FAMILY MEDICINE

## 2018-12-13 RX ORDER — MULTIVITAMIN
1 CAPSULE ORAL DAILY
Qty: 100 CAPSULE | Refills: 3 | Status: SHIPPED | OUTPATIENT
Start: 2018-12-13

## 2018-12-13 RX ORDER — SELENIUM 50 MCG
1 TABLET ORAL DAILY
Qty: 100 EACH | Refills: 3 | Status: SHIPPED | OUTPATIENT
Start: 2018-12-13

## 2018-12-13 RX ORDER — QUINIDINE GLUCONATE 324 MG
1 TABLET, EXTENDED RELEASE ORAL 2 TIMES DAILY
Qty: 180 EACH | Refills: 3 | Status: SHIPPED | OUTPATIENT
Start: 2018-12-13

## 2018-12-13 RX ORDER — VITAMIN B COMPLEX
1 CAPSULE ORAL DAILY
Qty: 100 TABLET | Refills: 3 | Status: SHIPPED | OUTPATIENT
Start: 2018-12-13

## 2018-12-13 RX ORDER — ACETAMINOPHEN 500 MG
1000 TABLET ORAL 2 TIMES DAILY
Qty: 250 TABLET | Refills: 2 | Status: SHIPPED | OUTPATIENT
Start: 2018-12-13

## 2018-12-13 NOTE — PROGRESS NOTES
Angelina Harris is a 73 y.o. female.      Assessment/Plan   Problem List Items Addressed This Visit        Cardiovascular and Mediastinum    Near syncope    Relevant Orders    Comprehensive Metabolic Panel    CBC & Differential    Atrial tachycardia (CMS/HCC)    Relevant Orders    TSH       Digestive    Diverticulosis of large intestine without hemorrhage       Musculoskeletal and Integument    Arthritis - Primary       Other    Dysphonia    Medicare annual wellness visit, initial    Relevant Orders    Lipid Panel         per patientTylenol for joint pain better than ibuprofen  Follow-up results of blood were primary management of chronic problems [facility] follow-up routine appointment in 3-6 months      Chief Complaint   Patient presents with   • digestive issues   • Arthritis - right shoulder/left hand   • Medicare Wellness-Initial Visit     Social History     Tobacco Use   • Smoking status: Never Smoker   • Smokeless tobacco: Never Used   Substance Use Topics   • Alcohol use: No   • Drug use: Not on file       History of Present Illness   Patient follows up for chronic issues of arthritis given atrial tachycardia occasional weakness and syncope history of diverticulosis as well as dysphonia she presently is concerned about arthritis and wrist treatments she is a available to take does want to take medication on regular basis she would like refills on her supplements that she takes and like written prescription so that her insurance will cover these recommended treatments  The following portions of the patient's history were reviewed and updated as appropriate:PMHroutine: Social history , Past Medical History, Surgical history , Allergies, Current Medications, Active Problem List and Health Maintenance    Review of Systems   Constitutional: Negative.    HENT: Negative.    Respiratory: Negative.    Cardiovascular: Negative.    Gastrointestinal: Negative.    Genitourinary: Negative.    Hematological: Negative.   "  Psychiatric/Behavioral: Negative.        Objective   Vitals:    12/13/18 1405   BP: 130/80   BP Location: Right arm   Patient Position: Sitting   Cuff Size: Adult   Pulse: 69   Temp: 97.8 °F (36.6 °C)   TempSrc: Oral   SpO2: 100%   Weight: 70.3 kg (155 lb)   Height: 170.2 cm (67\")     Body mass index is 24.28 kg/m².  Physical Exam   Constitutional: She is oriented to person, place, and time. She appears well-developed and well-nourished.   HENT:   Head: Normocephalic and atraumatic.   Eyes: Conjunctivae are normal. Pupils are equal, round, and reactive to light.   Neck: Normal range of motion. No thyromegaly present.   Cardiovascular: Normal rate and regular rhythm.   Pulmonary/Chest: Effort normal and breath sounds normal.   Musculoskeletal: Normal range of motion. She exhibits no edema or tenderness.   Neurological: She is oriented to person, place, and time.   Skin: Skin is warm. No erythema.   Psychiatric: She has a normal mood and affect. Her behavior is normal. Judgment and thought content normal.   Nursing note and vitals reviewed.    Reviewed Data:  No visits with results within 1 Month(s) from this visit.   Latest known visit with results is:   Lab on 03/20/2018   Component Date Value Ref Range Status   • Glucose 03/20/2018 101* 65 - 99 mg/dL Final   • BUN 03/20/2018 22  8 - 23 mg/dL Final   • Creatinine 03/20/2018 0.82  0.57 - 1.00 mg/dL Final   • Sodium 03/20/2018 140  136 - 145 mmol/L Final   • Potassium 03/20/2018 4.2  3.5 - 5.2 mmol/L Final   • Chloride 03/20/2018 100  98 - 107 mmol/L Final   • CO2 03/20/2018 27.6  22.0 - 29.0 mmol/L Final   • Calcium 03/20/2018 9.6  8.6 - 10.5 mg/dL Final   • eGFR Non  Amer 03/20/2018 68  >60 mL/min/1.73 Final   • BUN/Creatinine Ratio 03/20/2018 26.8* 7.0 - 25.0 Final   • Anion Gap 03/20/2018 12.4  mmol/L Final         "

## 2018-12-13 NOTE — PROGRESS NOTES
QUICK REFERENCE INFORMATION:  The ABCs of the Annual Wellness Visit    Initial Medicare Wellness Visit    HEALTH RISK ASSESSMENT    1945    Recent Hospitalizations:  No hospitalization(s) within the last year..        Current Medical Providers:  Patient Care Team:  Octavio Mixon MD as PCP - General (Family Medicine)        Smoking Status:  Social History     Tobacco Use   Smoking Status Never Smoker   Smokeless Tobacco Never Used       Alcohol Consumption:  Social History     Substance and Sexual Activity   Alcohol Use No       Depression Screen:   PHQ-2/PHQ-9 Depression Screening 12/13/2018   Little interest or pleasure in doing things 0   Feeling down, depressed, or hopeless 0   Trouble falling or staying asleep, or sleeping too much -   Feeling tired or having little energy -   Poor appetite or overeating -   Feeling bad about yourself - or that you are a failure or have let yourself or your family down -   Trouble concentrating on things, such as reading the newspaper or watching television -   Moving or speaking so slowly that other people could have noticed. Or the opposite - being so fidgety or restless that you have been moving around a lot more than usual -   Thoughts that you would be better off dead, or of hurting yourself in some way -   Total Score 0   If you checked off any problems, how difficult have these problems made it for you to do your work, take care of things at home, or get along with other people? -       Health Habits and Functional and Cognitive Screening:  Functional & Cognitive Status 12/13/2018   Do you have difficulty preparing food and eating? No   Do you have difficulty bathing yourself, getting dressed or grooming yourself? No   Do you have difficulty using the toilet? No   Do you have difficulty moving around from place to place? No   Do you have trouble with steps or getting out of a bed or a chair? No   In the past year have you fallen or experienced a near fall? No    Current Diet Well Balanced Diet   Dental Exam Up to date   Eye Exam Up to date   Exercise (times per week) 7 times per week   Current Exercise Activities Include Walking   Do you need help using the phone?  No   Are you deaf or do you have serious difficulty hearing?  No   Do you need help with transportation? No   Do you need help shopping? No   Do you need help preparing meals?  No   Do you need help with housework?  No   Do you need help with laundry? No   Do you need help taking your medications? No   Do you need help managing money? No   Do you ever drive or ride in a car without wearing a seat belt? No   Have you felt unusual stress, anger or loneliness in the last month? No   Who do you live with? Alone   If you need help, do you have trouble finding someone available to you? No   Have you been bothered in the last four weeks by sexual problems? No   Do you have difficulty concentrating, remembering or making decisions? No           Does the patient have evidence of cognitive impairment? No    Asiprin use counseling: Does not need ASA (and currently is not on it)      Recent Lab Results:    Visual Acuity:  No exam data present    Age-appropriate Screening Schedule:  Refer to the list below for future screening recommendations based on patient's age, sex and/or medical conditions. Orders for these recommended tests are listed in the plan section. The patient has been provided with a written plan.    Health Maintenance   Topic Date Due   • TDAP/TD VACCINES (1 - Tdap) 08/07/1962   • ZOSTER VACCINE (1 of 2) 08/07/1993   • PNEUMOCOCCAL VACCINES (65+ LOW/MEDIUM RISK) (1 of 2 - PCV13) 08/07/2008   • INFLUENZA VACCINE  08/01/2018   • LIPID PANEL  08/10/2018   • MAMMOGRAM  08/29/2019   • COLONOSCOPY  10/23/2020   • DXA SCAN  Discontinued        Subjective   History of Present Illness    Angelina Harris is a 73 y.o. female who presents for an Annual Wellness Visit.    The following portions of the patient's history were  reviewed and updated as appropriate: allergies, current medications, past family history, past medical history, past social history, past surgical history and problem list.    Outpatient Medications Prior to Visit   Medication Sig Dispense Refill   • ACETAMINOPHEN PO Take 1 tablet by mouth 2 (Two) Times a Day.     • B Complex Vitamins (VITAMIN B COMPLEX) capsule capsule Take 1 capsule by mouth Daily.     • Calcium Carbonate-Vitamin D (CALCIUM 500 + D PO) Take 1 tablet by mouth daily.     • Cholecalciferol (VITAMIN D) 1000 UNITS capsule Take 1 capsule by mouth 2 (two) times a day.     • Flaxseed, Linseed, (FLAX SEED OIL PO) Take 1 capsule by mouth 2 (two) times a day.     • Glucosamine-Chondroit-Vit C-Mn (GLUCOSAMINE-CHONDROITIN) tablet Take 1 tablet by mouth 2 (Two) Times a Day As Needed.     • Lactobacillus (ACIDOPHILUS PO) Take 1 tablet by mouth Daily.     • Multiple Vitamin (MULTIVITAMIN) capsule Take 1 capsule by mouth daily.     • Psyllium 30.9 % powder Take 1 packet by mouth daily.     • fluocinolone acetonide (DERMOTIC) 0.01 % oil otic oil 2-3 drops to right ear bid 7 days 20 mL 0     No facility-administered medications prior to visit.        Patient Active Problem List   Diagnosis   • Awareness of heartbeats   • HLD (hyperlipidemia)   • OP (osteoporosis)   • Avitaminosis D   • Plantar fascia syndrome   • Adenomatous polyp   • Near syncope   • Dysphonia   • Atrial tachycardia (CMS/HCC)   • Acute otitis externa of right ear   • Medicare annual wellness visit, initial   • Arthritis       Advance Care Planning:  has NO advance directive - information provided to the patient today    Identification of Risk Factors:  Risk factors include: unhealthy diet.    Review of Systems    Compared to one year ago, the patient feels her physical health is the same.  Compared to one year ago, the patient feels her mental health is the same.    Objective     Physical Exam    Vitals:    12/13/18 1405   BP: 130/80   BP Location:  "Right arm   Patient Position: Sitting   Cuff Size: Adult   Pulse: 69   Temp: 97.8 °F (36.6 °C)   TempSrc: Oral   SpO2: 100%   Weight: 70.3 kg (155 lb)   Height: 170.2 cm (67\")   PainSc:   4       Patient's Body mass index is 24.28 kg/m². BMI is within normal parameters. No follow-up required.      Assessment/Plan   Patient Self-Management and Personalized Health Advice  The patient has been provided with information about: exercise and supplements and preventive services including:   · Advance directive.    Visit Diagnoses:    ICD-10-CM ICD-9-CM   1. Arthritis M19.90 716.90   2. Medicare annual wellness visit, initial Z00.00 V70.0       No orders of the defined types were placed in this encounter.      Outpatient Encounter Medications as of 12/13/2018   Medication Sig Dispense Refill   • ACETAMINOPHEN PO Take 1 tablet by mouth 2 (Two) Times a Day.     • B Complex Vitamins (VITAMIN B COMPLEX) capsule capsule Take 1 capsule by mouth Daily.     • Calcium Carbonate-Vitamin D (CALCIUM 500 + D PO) Take 1 tablet by mouth daily.     • Cholecalciferol (VITAMIN D) 1000 UNITS capsule Take 1 capsule by mouth 2 (two) times a day.     • Flaxseed, Linseed, (FLAX SEED OIL PO) Take 1 capsule by mouth 2 (two) times a day.     • Glucosamine-Chondroit-Vit C-Mn (GLUCOSAMINE-CHONDROITIN) tablet Take 1 tablet by mouth 2 (Two) Times a Day As Needed.     • Lactobacillus (ACIDOPHILUS PO) Take 1 tablet by mouth Daily.     • Multiple Vitamin (MULTIVITAMIN) capsule Take 1 capsule by mouth daily.     • Psyllium 30.9 % powder Take 1 packet by mouth daily.     • [DISCONTINUED] fluocinolone acetonide (DERMOTIC) 0.01 % oil otic oil 2-3 drops to right ear bid 7 days 20 mL 0     No facility-administered encounter medications on file as of 12/13/2018.        Reviewed use of high risk medication in the elderly: not applicable  Reviewed for potential of harmful drug interactions in the elderly: not applicable    Follow Up:  Chronic problems     An After " Visit Summary and PPPS with all of these plans were given to the patient.

## 2018-12-14 ENCOUNTER — TELEPHONE (OUTPATIENT)
Dept: INTERNAL MEDICINE | Facility: CLINIC | Age: 73
End: 2018-12-14

## 2018-12-14 DIAGNOSIS — I10 HYPERTENSION, UNSPECIFIED TYPE: Primary | ICD-10-CM

## 2018-12-14 NOTE — TELEPHONE ENCOUNTER
Patient called stating that she forgot to ask Dr. Mixon if he would check a urinalysis.  She is coming in Tuesday and would like to have an order for this so she can have this done at her lab appointment on Tuesday.  Please advise.

## 2018-12-18 LAB
ALBUMIN SERPL-MCNC: 4.3 G/DL (ref 3.5–5.2)
ALBUMIN/GLOB SERPL: 1.8 G/DL
ALP SERPL-CCNC: 83 U/L (ref 39–117)
ALT SERPL-CCNC: 20 U/L (ref 1–33)
AST SERPL-CCNC: 21 U/L (ref 1–32)
BASOPHILS # BLD AUTO: 0.03 10*3/MM3 (ref 0–0.2)
BASOPHILS NFR BLD AUTO: 0.7 % (ref 0–1.5)
BILIRUB SERPL-MCNC: 0.6 MG/DL (ref 0.1–1.2)
BUN SERPL-MCNC: 19 MG/DL (ref 8–23)
BUN/CREAT SERPL: 24.1 (ref 7–25)
CALCIUM SERPL-MCNC: 9.4 MG/DL (ref 8.6–10.5)
CHLORIDE SERPL-SCNC: 106 MMOL/L (ref 98–107)
CHOLEST SERPL-MCNC: 249 MG/DL (ref 0–200)
CO2 SERPL-SCNC: 26.1 MMOL/L (ref 22–29)
CREAT SERPL-MCNC: 0.79 MG/DL (ref 0.57–1)
EOSINOPHIL # BLD AUTO: 0.1 10*3/MM3 (ref 0–0.7)
EOSINOPHIL NFR BLD AUTO: 2.2 % (ref 0.3–6.2)
ERYTHROCYTE [DISTWIDTH] IN BLOOD BY AUTOMATED COUNT: 13.1 % (ref 11.7–13)
GLOBULIN SER CALC-MCNC: 2.4 GM/DL
GLUCOSE SERPL-MCNC: 99 MG/DL (ref 65–99)
HCT VFR BLD AUTO: 41.3 % (ref 35.6–45.5)
HDLC SERPL-MCNC: 50 MG/DL (ref 40–60)
HGB BLD-MCNC: 13.1 G/DL (ref 11.9–15.5)
IMM GRANULOCYTES # BLD: 0 10*3/MM3 (ref 0–0.03)
IMM GRANULOCYTES NFR BLD: 0 % (ref 0–0.5)
LDLC SERPL CALC-MCNC: 166 MG/DL (ref 0–100)
LYMPHOCYTES # BLD AUTO: 1.13 10*3/MM3 (ref 0.9–4.8)
LYMPHOCYTES NFR BLD AUTO: 25.2 % (ref 19.6–45.3)
MCH RBC QN AUTO: 30.8 PG (ref 26.9–32)
MCHC RBC AUTO-ENTMCNC: 31.7 G/DL (ref 32.4–36.3)
MCV RBC AUTO: 96.9 FL (ref 80.5–98.2)
MONOCYTES # BLD AUTO: 0.38 10*3/MM3 (ref 0.2–1.2)
MONOCYTES NFR BLD AUTO: 8.5 % (ref 5–12)
NEUTROPHILS # BLD AUTO: 2.84 10*3/MM3 (ref 1.9–8.1)
NEUTROPHILS NFR BLD AUTO: 63.4 % (ref 42.7–76)
PLATELET # BLD AUTO: 159 10*3/MM3 (ref 140–500)
POTASSIUM SERPL-SCNC: 4.2 MMOL/L (ref 3.5–5.2)
PROT SERPL-MCNC: 6.7 G/DL (ref 6–8.5)
RBC # BLD AUTO: 4.26 10*6/MM3 (ref 3.9–5.2)
SODIUM SERPL-SCNC: 144 MMOL/L (ref 136–145)
TRIGL SERPL-MCNC: 167 MG/DL (ref 0–150)
TSH SERPL DL<=0.005 MIU/L-ACNC: 2.49 MIU/ML (ref 0.27–4.2)
VLDLC SERPL CALC-MCNC: 33.4 MG/DL (ref 5–40)
WBC # BLD AUTO: 4.48 10*3/MM3 (ref 4.5–10.7)

## 2018-12-19 LAB
APPEARANCE UR: CLEAR
BACTERIA #/AREA URNS HPF: NORMAL /HPF
BILIRUB UR QL STRIP: NEGATIVE
COLOR UR: YELLOW
EPI CELLS #/AREA URNS HPF: NORMAL /HPF
GLUCOSE UR QL: NEGATIVE
HGB UR QL STRIP: NEGATIVE
KETONES UR QL STRIP: NEGATIVE
LEUKOCYTE ESTERASE UR QL STRIP: NEGATIVE
MICRO URNS: NORMAL
MICRO URNS: NORMAL
MUCOUS THREADS URNS QL MICRO: PRESENT /HPF
NITRITE UR QL STRIP: NEGATIVE
PH UR STRIP: 5.5 [PH] (ref 5–7.5)
PROT UR QL STRIP: NEGATIVE
RBC #/AREA URNS HPF: NORMAL /HPF
SP GR UR: 1.02 (ref 1–1.03)
URINALYSIS REFLEX: NORMAL
UROBILINOGEN UR STRIP-MCNC: 0.2 MG/DL (ref 0.2–1)
WBC #/AREA URNS HPF: NORMAL /HPF

## 2018-12-26 ENCOUNTER — TELEPHONE (OUTPATIENT)
Dept: INTERNAL MEDICINE | Facility: CLINIC | Age: 73
End: 2018-12-26

## 2019-01-31 ENCOUNTER — APPOINTMENT (OUTPATIENT)
Dept: PREADMISSION TESTING | Facility: HOSPITAL | Age: 74
End: 2019-01-31

## 2019-01-31 VITALS
SYSTOLIC BLOOD PRESSURE: 112 MMHG | HEART RATE: 74 BPM | TEMPERATURE: 98.7 F | BODY MASS INDEX: 23.79 KG/M2 | OXYGEN SATURATION: 97 % | DIASTOLIC BLOOD PRESSURE: 70 MMHG | WEIGHT: 157 LBS | HEIGHT: 68 IN | RESPIRATION RATE: 20 BRPM

## 2019-01-31 LAB
ANION GAP SERPL CALCULATED.3IONS-SCNC: 11.2 MMOL/L
BUN BLD-MCNC: 15 MG/DL (ref 8–23)
BUN/CREAT SERPL: 16.3 (ref 7–25)
CALCIUM SPEC-SCNC: 9 MG/DL (ref 8.6–10.5)
CHLORIDE SERPL-SCNC: 107 MMOL/L (ref 98–107)
CO2 SERPL-SCNC: 22.8 MMOL/L (ref 22–29)
CREAT BLD-MCNC: 0.92 MG/DL (ref 0.57–1)
DEPRECATED RDW RBC AUTO: 44.2 FL (ref 37–54)
ERYTHROCYTE [DISTWIDTH] IN BLOOD BY AUTOMATED COUNT: 12.8 % (ref 11.7–13)
GFR SERPL CREATININE-BSD FRML MDRD: 60 ML/MIN/1.73
GLUCOSE BLD-MCNC: 95 MG/DL (ref 65–99)
HCT VFR BLD AUTO: 36.8 % (ref 35.6–45.5)
HGB BLD-MCNC: 12.3 G/DL (ref 11.9–15.5)
MCH RBC QN AUTO: 31.5 PG (ref 26.9–32)
MCHC RBC AUTO-ENTMCNC: 33.4 G/DL (ref 32.4–36.3)
MCV RBC AUTO: 94.4 FL (ref 80.5–98.2)
PLATELET # BLD AUTO: 149 10*3/MM3 (ref 140–500)
PMV BLD AUTO: 9.2 FL (ref 6–12)
POTASSIUM BLD-SCNC: 4.2 MMOL/L (ref 3.5–5.2)
RBC # BLD AUTO: 3.9 10*6/MM3 (ref 3.9–5.2)
SODIUM BLD-SCNC: 141 MMOL/L (ref 136–145)
WBC NRBC COR # BLD: 5.1 10*3/MM3 (ref 4.5–10.7)

## 2019-01-31 PROCEDURE — 93010 ELECTROCARDIOGRAM REPORT: CPT | Performed by: INTERNAL MEDICINE

## 2019-01-31 PROCEDURE — 36415 COLL VENOUS BLD VENIPUNCTURE: CPT

## 2019-01-31 PROCEDURE — 85027 COMPLETE CBC AUTOMATED: CPT | Performed by: OPHTHALMOLOGY

## 2019-01-31 PROCEDURE — 80048 BASIC METABOLIC PNL TOTAL CA: CPT | Performed by: OPHTHALMOLOGY

## 2019-01-31 PROCEDURE — 93005 ELECTROCARDIOGRAM TRACING: CPT

## 2019-02-07 ENCOUNTER — HOSPITAL ENCOUNTER (OUTPATIENT)
Facility: HOSPITAL | Age: 74
Setting detail: HOSPITAL OUTPATIENT SURGERY
Discharge: HOME | End: 2019-02-07
Attending: OPHTHALMOLOGY | Admitting: OPHTHALMOLOGY

## 2019-02-07 ENCOUNTER — ANESTHESIA (OUTPATIENT)
Dept: PERIOP | Facility: HOSPITAL | Age: 74
End: 2019-02-07

## 2019-02-07 ENCOUNTER — ANESTHESIA EVENT (OUTPATIENT)
Dept: PERIOP | Facility: HOSPITAL | Age: 74
End: 2019-02-07

## 2019-02-07 VITALS
RESPIRATION RATE: 20 BRPM | SYSTOLIC BLOOD PRESSURE: 145 MMHG | HEART RATE: 74 BPM | TEMPERATURE: 98 F | OXYGEN SATURATION: 99 % | DIASTOLIC BLOOD PRESSURE: 87 MMHG

## 2019-02-07 DIAGNOSIS — H02.9 LESION OF LOWER EYELID: ICD-10-CM

## 2019-02-07 PROCEDURE — 88305 TISSUE EXAM BY PATHOLOGIST: CPT | Performed by: OPHTHALMOLOGY

## 2019-02-07 PROCEDURE — 25010000002 PROPOFOL 10 MG/ML EMULSION: Performed by: NURSE ANESTHETIST, CERTIFIED REGISTERED

## 2019-02-07 RX ORDER — PROMETHAZINE HYDROCHLORIDE 25 MG/ML
12.5 INJECTION, SOLUTION INTRAMUSCULAR; INTRAVENOUS ONCE AS NEEDED
Status: CANCELLED | OUTPATIENT
Start: 2019-02-07

## 2019-02-07 RX ORDER — FLUMAZENIL 0.1 MG/ML
0.2 INJECTION INTRAVENOUS AS NEEDED
Status: CANCELLED | OUTPATIENT
Start: 2019-02-07

## 2019-02-07 RX ORDER — PROPOFOL 10 MG/ML
VIAL (ML) INTRAVENOUS AS NEEDED
Status: DISCONTINUED | OUTPATIENT
Start: 2019-02-07 | End: 2019-02-07 | Stop reason: SURG

## 2019-02-07 RX ORDER — EPHEDRINE SULFATE 50 MG/ML
5 INJECTION, SOLUTION INTRAVENOUS ONCE AS NEEDED
Status: DISCONTINUED | OUTPATIENT
Start: 2019-02-07 | End: 2019-02-07 | Stop reason: HOSPADM

## 2019-02-07 RX ORDER — HYDROMORPHONE HYDROCHLORIDE 1 MG/ML
0.5 INJECTION, SOLUTION INTRAMUSCULAR; INTRAVENOUS; SUBCUTANEOUS
Status: DISCONTINUED | OUTPATIENT
Start: 2019-02-07 | End: 2019-02-07 | Stop reason: HOSPADM

## 2019-02-07 RX ORDER — MIDAZOLAM HYDROCHLORIDE 1 MG/ML
2 INJECTION INTRAMUSCULAR; INTRAVENOUS
Status: DISCONTINUED | OUTPATIENT
Start: 2019-02-07 | End: 2019-02-07 | Stop reason: HOSPADM

## 2019-02-07 RX ORDER — HYDROCODONE BITARTRATE AND ACETAMINOPHEN 7.5; 325 MG/1; MG/1
1 TABLET ORAL ONCE AS NEEDED
Status: CANCELLED | OUTPATIENT
Start: 2019-02-07

## 2019-02-07 RX ORDER — ERYTHROMYCIN 5 MG/G
OINTMENT OPHTHALMIC 2 TIMES DAILY
Qty: 3.5 G | Refills: 0 | Status: SHIPPED | OUTPATIENT
Start: 2019-02-07 | End: 2019-02-14

## 2019-02-07 RX ORDER — LIDOCAINE HYDROCHLORIDE 20 MG/ML
INJECTION, SOLUTION INFILTRATION; PERINEURAL AS NEEDED
Status: DISCONTINUED | OUTPATIENT
Start: 2019-02-07 | End: 2019-02-07 | Stop reason: SURG

## 2019-02-07 RX ORDER — FAMOTIDINE 10 MG/ML
20 INJECTION, SOLUTION INTRAVENOUS ONCE
Status: DISCONTINUED | OUTPATIENT
Start: 2019-02-07 | End: 2019-02-07 | Stop reason: HOSPADM

## 2019-02-07 RX ORDER — MIDAZOLAM HYDROCHLORIDE 1 MG/ML
1 INJECTION INTRAMUSCULAR; INTRAVENOUS
Status: DISCONTINUED | OUTPATIENT
Start: 2019-02-07 | End: 2019-02-07 | Stop reason: HOSPADM

## 2019-02-07 RX ORDER — FENTANYL CITRATE 50 UG/ML
50 INJECTION, SOLUTION INTRAMUSCULAR; INTRAVENOUS
Status: CANCELLED | OUTPATIENT
Start: 2019-02-07

## 2019-02-07 RX ORDER — LABETALOL HYDROCHLORIDE 5 MG/ML
5 INJECTION, SOLUTION INTRAVENOUS
Status: CANCELLED | OUTPATIENT
Start: 2019-02-07

## 2019-02-07 RX ORDER — SODIUM CHLORIDE 0.9 % (FLUSH) 0.9 %
1-10 SYRINGE (ML) INJECTION AS NEEDED
Status: DISCONTINUED | OUTPATIENT
Start: 2019-02-07 | End: 2019-02-07 | Stop reason: HOSPADM

## 2019-02-07 RX ORDER — NALOXONE HCL 0.4 MG/ML
0.2 VIAL (ML) INJECTION AS NEEDED
Status: CANCELLED | OUTPATIENT
Start: 2019-02-07

## 2019-02-07 RX ORDER — OXYCODONE AND ACETAMINOPHEN 7.5; 325 MG/1; MG/1
1 TABLET ORAL ONCE AS NEEDED
Status: DISCONTINUED | OUTPATIENT
Start: 2019-02-07 | End: 2019-02-07 | Stop reason: HOSPADM

## 2019-02-07 RX ORDER — PROMETHAZINE HYDROCHLORIDE 25 MG/1
25 TABLET ORAL ONCE AS NEEDED
Status: CANCELLED | OUTPATIENT
Start: 2019-02-07

## 2019-02-07 RX ORDER — HYDRALAZINE HYDROCHLORIDE 20 MG/ML
5 INJECTION INTRAMUSCULAR; INTRAVENOUS
Status: CANCELLED | OUTPATIENT
Start: 2019-02-07

## 2019-02-07 RX ORDER — HYDROCODONE BITARTRATE AND ACETAMINOPHEN 5; 325 MG/1; MG/1
1 TABLET ORAL EVERY 4 HOURS PRN
Qty: 15 TABLET | Refills: 0 | Status: SHIPPED | OUTPATIENT
Start: 2019-02-07 | End: 2019-10-11

## 2019-02-07 RX ORDER — LIDOCAINE HYDROCHLORIDE 10 MG/ML
0.5 INJECTION, SOLUTION EPIDURAL; INFILTRATION; INTRACAUDAL; PERINEURAL ONCE AS NEEDED
Status: DISCONTINUED | OUTPATIENT
Start: 2019-02-07 | End: 2019-02-07 | Stop reason: HOSPADM

## 2019-02-07 RX ORDER — EPHEDRINE SULFATE 50 MG/ML
5 INJECTION, SOLUTION INTRAVENOUS ONCE AS NEEDED
Status: CANCELLED | OUTPATIENT
Start: 2019-02-07

## 2019-02-07 RX ORDER — TETRACAINE HYDROCHLORIDE 5 MG/ML
SOLUTION OPHTHALMIC AS NEEDED
Status: DISCONTINUED | OUTPATIENT
Start: 2019-02-07 | End: 2019-02-07 | Stop reason: HOSPADM

## 2019-02-07 RX ORDER — ACETAMINOPHEN 650 MG/1
650 SUPPOSITORY RECTAL ONCE AS NEEDED
Status: COMPLETED | OUTPATIENT
Start: 2019-02-07 | End: 2019-02-07

## 2019-02-07 RX ORDER — SODIUM CHLORIDE, SODIUM LACTATE, POTASSIUM CHLORIDE, CALCIUM CHLORIDE 600; 310; 30; 20 MG/100ML; MG/100ML; MG/100ML; MG/100ML
INJECTION, SOLUTION INTRAVENOUS CONTINUOUS PRN
Status: DISCONTINUED | OUTPATIENT
Start: 2019-02-07 | End: 2019-02-07 | Stop reason: SURG

## 2019-02-07 RX ORDER — OXYCODONE AND ACETAMINOPHEN 7.5; 325 MG/1; MG/1
1 TABLET ORAL ONCE AS NEEDED
Status: CANCELLED | OUTPATIENT
Start: 2019-02-07

## 2019-02-07 RX ORDER — ACETAMINOPHEN 650 MG/1
650 SUPPOSITORY RECTAL ONCE AS NEEDED
Status: DISCONTINUED | OUTPATIENT
Start: 2019-02-07 | End: 2019-02-07 | Stop reason: HOSPADM

## 2019-02-07 RX ORDER — PROMETHAZINE HYDROCHLORIDE 25 MG/1
25 SUPPOSITORY RECTAL ONCE AS NEEDED
Status: CANCELLED | OUTPATIENT
Start: 2019-02-07

## 2019-02-07 RX ORDER — MAGNESIUM HYDROXIDE 1200 MG/15ML
LIQUID ORAL AS NEEDED
Status: DISCONTINUED | OUTPATIENT
Start: 2019-02-07 | End: 2019-02-07 | Stop reason: HOSPADM

## 2019-02-07 RX ORDER — DIPHENHYDRAMINE HCL 25 MG
25 CAPSULE ORAL
Status: CANCELLED | OUTPATIENT
Start: 2019-02-07

## 2019-02-07 RX ORDER — FENTANYL CITRATE 50 UG/ML
50 INJECTION, SOLUTION INTRAMUSCULAR; INTRAVENOUS
Status: DISCONTINUED | OUTPATIENT
Start: 2019-02-07 | End: 2019-02-07 | Stop reason: HOSPADM

## 2019-02-07 RX ORDER — NALOXONE HCL 0.4 MG/ML
0.2 VIAL (ML) INJECTION AS NEEDED
Status: DISCONTINUED | OUTPATIENT
Start: 2019-02-07 | End: 2019-02-07 | Stop reason: HOSPADM

## 2019-02-07 RX ORDER — HYDROCODONE BITARTRATE AND ACETAMINOPHEN 7.5; 325 MG/1; MG/1
1 TABLET ORAL ONCE AS NEEDED
Status: DISCONTINUED | OUTPATIENT
Start: 2019-02-07 | End: 2019-02-07 | Stop reason: HOSPADM

## 2019-02-07 RX ORDER — ERYTHROMYCIN 5 MG/G
OINTMENT OPHTHALMIC AS NEEDED
Status: DISCONTINUED | OUTPATIENT
Start: 2019-02-07 | End: 2019-02-07 | Stop reason: HOSPADM

## 2019-02-07 RX ORDER — PROPOFOL 10 MG/ML
VIAL (ML) INTRAVENOUS CONTINUOUS PRN
Status: DISCONTINUED | OUTPATIENT
Start: 2019-02-07 | End: 2019-02-07 | Stop reason: SURG

## 2019-02-07 RX ORDER — FLUMAZENIL 0.1 MG/ML
0.2 INJECTION INTRAVENOUS AS NEEDED
Status: DISCONTINUED | OUTPATIENT
Start: 2019-02-07 | End: 2019-02-07 | Stop reason: HOSPADM

## 2019-02-07 RX ORDER — SODIUM CHLORIDE, SODIUM LACTATE, POTASSIUM CHLORIDE, CALCIUM CHLORIDE 600; 310; 30; 20 MG/100ML; MG/100ML; MG/100ML; MG/100ML
9 INJECTION, SOLUTION INTRAVENOUS CONTINUOUS
Status: DISCONTINUED | OUTPATIENT
Start: 2019-02-07 | End: 2019-02-07 | Stop reason: HOSPADM

## 2019-02-07 RX ORDER — DIPHENHYDRAMINE HYDROCHLORIDE 50 MG/ML
12.5 INJECTION INTRAMUSCULAR; INTRAVENOUS
Status: CANCELLED | OUTPATIENT
Start: 2019-02-07

## 2019-02-07 RX ORDER — ONDANSETRON 2 MG/ML
4 INJECTION INTRAMUSCULAR; INTRAVENOUS ONCE AS NEEDED
Status: DISCONTINUED | OUTPATIENT
Start: 2019-02-07 | End: 2019-02-07 | Stop reason: HOSPADM

## 2019-02-07 RX ORDER — ACETAMINOPHEN 325 MG/1
650 TABLET ORAL ONCE AS NEEDED
Status: DISCONTINUED | OUTPATIENT
Start: 2019-02-07 | End: 2019-02-07 | Stop reason: HOSPADM

## 2019-02-07 RX ORDER — ACETAMINOPHEN 325 MG/1
650 TABLET ORAL ONCE AS NEEDED
Status: COMPLETED | OUTPATIENT
Start: 2019-02-07 | End: 2019-02-07

## 2019-02-07 RX ORDER — HYDROMORPHONE HYDROCHLORIDE 1 MG/ML
0.5 INJECTION, SOLUTION INTRAMUSCULAR; INTRAVENOUS; SUBCUTANEOUS
Status: CANCELLED | OUTPATIENT
Start: 2019-02-07

## 2019-02-07 RX ORDER — ONDANSETRON 2 MG/ML
4 INJECTION INTRAMUSCULAR; INTRAVENOUS ONCE AS NEEDED
Status: CANCELLED | OUTPATIENT
Start: 2019-02-07

## 2019-02-07 RX ADMIN — LIDOCAINE HYDROCHLORIDE 80 MG: 20 INJECTION, SOLUTION INFILTRATION; PERINEURAL at 14:20

## 2019-02-07 RX ADMIN — PROPOFOL 70 MG: 10 INJECTION, EMULSION INTRAVENOUS at 14:24

## 2019-02-07 RX ADMIN — PROPOFOL 140 MCG/KG/MIN: 10 INJECTION, EMULSION INTRAVENOUS at 14:20

## 2019-02-07 RX ADMIN — SODIUM CHLORIDE, POTASSIUM CHLORIDE, SODIUM LACTATE AND CALCIUM CHLORIDE: 600; 310; 30; 20 INJECTION, SOLUTION INTRAVENOUS at 12:52

## 2019-02-07 RX ADMIN — ACETAMINOPHEN 650 MG: 325 TABLET, FILM COATED ORAL at 15:49

## 2019-02-07 RX ADMIN — SODIUM CHLORIDE, POTASSIUM CHLORIDE, SODIUM LACTATE AND CALCIUM CHLORIDE 9 ML/HR: 600; 310; 30; 20 INJECTION, SOLUTION INTRAVENOUS at 12:12

## 2019-02-07 NOTE — ANESTHESIA POSTPROCEDURE EVALUATION
Patient: Angelina Harris    Procedure Summary     Date:  02/07/19 Room / [facility]:   [facility]    Anesthesia Start:  1417 Anesthesia Stop:  1501    Procedure:  LEFT LOWER LID EXCISION LESS THAN 1/4 LEFT LOWER LID (Left Eye) Diagnosis:      Surgeon:  Shawn Richardson MD Provider:  Mendoza Jaquez MD    Anesthesia Type:  MAC ASA Status:  3          Anesthesia Type: MAC  Last vitals  BP   152/86 (02/07/19 1505)   Temp   36.7 °C (98 °F) (02/07/19 1500)   Pulse   72 (02/07/19 1505)   Resp   20 (02/07/19 1505)     SpO2   97 % (02/07/19 1505)     Post Anesthesia Care and Evaluation    Patient location during evaluation: bedside  Patient participation: complete - patient participated  Level of consciousness: awake  Pain score: 2  Pain management: adequate  Airway patency: patent  Anesthetic complications: No anesthetic complications  PONV Status: none  Cardiovascular status: acceptable  Respiratory status: acceptable  Hydration status: acceptable    Comments: /86   Pulse 72   Temp 36.7 °C (98 °F) (Oral)   Resp 20   SpO2 97%

## 2019-02-07 NOTE — H&P
History & Physical       Patient: Angelina Harris    Date of Admission: 2/7/2019 10:58 AM    YOB: 1945    Medical Record Number: [id number]      Chief Complaints: lesion left lower lid with laxity      History of Present Illness: 73 y.o. female presents with lesion of the left lower lid and ectropion left lower lid. No new meds/health problems since office visit      Allergies:   Allergies   Allergen Reactions   • Other Other (See Comments)     Eye drops - preservative? irritation   • Flu Virus Vaccine Rash       10 point review of systems negative, except pertaining to the HPI    Medications:   Home Medications:  No current facility-administered medications on file prior to encounter.      Current Outpatient Medications on File Prior to Encounter   Medication Sig   • acetaminophen (TYLENOL) 500 MG tablet Take 2 tablets by mouth 2 (Two) Times a Day.   • B Complex Vitamins (VITAMIN B COMPLEX) capsule capsule Take 1 capsule by mouth Daily.   • Calcium Carb-Cholecalciferol (CALCIUM 500 + D) 500-200 MG-UNIT tablet Take 2 tablets by mouth Daily.   • Cholecalciferol (VITAMIN D) 1000 UNITS capsule Take 1 capsule by mouth 2 (two) times a day.   • Flaxseed, Linseed, (FLAX SEED OIL PO) Take 1 capsule by mouth 2 (two) times a day.   • Glucosamine-Chondroit-Vit C-Mn (GLUCOSAMINE-CHONDROITIN) tablet Take 1 tablet by mouth 2 (Two) Times a Day.   • Lactobacillus (ACIDOPHILUS) capsule Take 1 capsule by mouth Daily.   • Multiple Vitamin (MULTIVITAMIN) capsule Take 1 capsule by mouth Daily.   • Psyllium 30.9 % powder Take 1 packet by mouth Daily.     Current Medications:  Scheduled Meds:  Continuous Infusions:  No current facility-administered medications for this encounter.   PRN Meds:.    Past Medical History:   Diagnosis Date   • Adenomatous polyp    • Arthritis    • Diverticular disease    • Fatigue    • GERD (gastroesophageal reflux disease)    • History of mononucleosis    • Hyperlipidemia    • Near syncope    •  Osteopenia    • Tremors of nervous system    • Wrist pain         Past Surgical History:   Procedure Laterality Date   • BREAST EXCISIONAL BIOPSY Right     done prior to 2007; benign   • COLONOSCOPY     • EYE SURGERY      cataract   • LIPOMA EXCISION          Social History     Occupational History   • Occupation: retired   Tobacco Use   • Smoking status: Never Smoker   • Smokeless tobacco: Never Used   Substance and Sexual Activity   • Alcohol use: No   • Drug use: No   • Sexual activity: Defer    Social History     Social History Narrative   • Not on file        Family History   Problem Relation Age of Onset   • Atrial fibrillation Mother    • Breast cancer Paternal Aunt    • Malig Hyperthermia Neg Hx            Physical Exam   Constitutional: Alert, cooperative, in no acute distress    Head: Normocephalic.   Eyes:   LLL ectropion and central lid lesion uncertain  Neck: Normal range of motion.   Cardiovascular: Normal rate.    Pulmonary/Chest: Effort normal.   Neurological: Alert.   Skin: Skin is warm.   Psychiatric: Normal mood and affect.       Assessment/Plan:  The patient voiced understanding of the risks, benefits, and alternative forms of treatment that were discussed and the patient consents to proceed with LEFT LOWER LID EXCISION LESS THAN 1/4 LEFT LOWER LID ECTROPIAN REPAIR.       Shawn Richardson MD

## 2019-02-07 NOTE — OP NOTE
OPERATIVE NOTE    Patient Identification:  Name: Angelina Harris  Age: 73 y.o.  Sex: female  :  1945  MRN: [id number]                                               Preoperative diagnosis: left lower lid lesion uncertain  Postoperative diagnosis: same  Procedure: left lower lid lesion excision and repair <  Surgeon: Dr. Shawn Richardson who was present and scrubbed throughout all critical portions of the operation  Assistants: Quan Singleton MD  Anesthesia: MAC  EBL: less than 50cc  Specimens:    Order Name Source Comment Collection Info Order Time   TISSUE PATHOLOGY EXAM Eyelid, Left  Collected By: Shawn Richardson MD 2019  2:38 PM       Description of the procedure:     The patient was taken to the operating room and placed on the table in the supine position, where anesthesia was induced. 2% lidocaine with epinephrine and 0.5% marcaine in a 1:1 fashion was injected over the surgical site, and the patient was prepped and draped in the usual manner for orbitofacial surgery.     Corneal protectors were placed in both eyes.    A 15 Bard-Christian blade incision was made 2 mm below the left lower eyelid margin. Sharp dissection was carried down to the tarsal plate. A full-thickness en bloc excision of the eyelid margin and tarsus was carried out. The lesion was removed in its entirety. The tarsal plate was closed with interrupted 5-0 vicryl sutures, partially penetrating the tarsus to avoid corneal irritation. The eyelid margin was closed with a 5-0 vicryl suture through the mucocutaneous junction. The skin was closed with 5-0 gut suture.    The corneal protectors were removed and antibiotic ophthalmic ointment was placed over the surgical site.     The patient was then awakened and taken from the operating room in good condition, having tolerated the procedure well. There were no complications, and the estimated blood loss was less than 50 cc.

## 2019-02-07 NOTE — ANESTHESIA PREPROCEDURE EVALUATION
Anesthesia Evaluation     Patient summary reviewed and Nursing notes reviewed   NPO Solid Status: > 8 hours  NPO Liquid Status: > 2 hours           Airway   Mallampati: II  TM distance: >3 FB  Neck ROM: full  Dental - normal exam     Pulmonary - negative pulmonary ROS and normal exam   Cardiovascular - normal exam    (+) hyperlipidemia,       Neuro/Psych  (+) tremors,     GI/Hepatic/Renal/Endo    (+)  GERD,      Musculoskeletal     Abdominal    Substance History - negative use     OB/GYN negative ob/gyn ROS         Other   (+) arthritis                     Anesthesia Plan    ASA 3     MAC     Anesthetic plan, all risks, benefits, and alternatives have been provided, discussed and informed consent has been obtained with: patient.

## 2019-02-08 LAB
CYTO UR: NORMAL
LAB AP CASE REPORT: NORMAL
LAB AP DIAGNOSIS COMMENT: NORMAL
PATH REPORT.FINAL DX SPEC: NORMAL
PATH REPORT.GROSS SPEC: NORMAL

## 2019-03-19 ENCOUNTER — TELEPHONE (OUTPATIENT)
Dept: INTERNAL MEDICINE | Facility: CLINIC | Age: 74
End: 2019-03-19

## 2019-03-19 DIAGNOSIS — M79.642 LEFT HAND PAIN: Primary | ICD-10-CM

## 2019-03-19 NOTE — TELEPHONE ENCOUNTER
Patient called stating that she would like to have a referral to see [facility] for left hand pain.  This was discussed at her last visit with Dr. Mixon.  Please advise.

## 2019-10-11 ENCOUNTER — OFFICE VISIT (OUTPATIENT)
Dept: INTERNAL MEDICINE | Facility: CLINIC | Age: 74
End: 2019-10-11

## 2019-10-11 VITALS
SYSTOLIC BLOOD PRESSURE: 144 MMHG | HEART RATE: 70 BPM | HEIGHT: 68 IN | TEMPERATURE: 98.2 F | DIASTOLIC BLOOD PRESSURE: 82 MMHG | WEIGHT: 153.3 LBS | BODY MASS INDEX: 23.23 KG/M2 | OXYGEN SATURATION: 98 %

## 2019-10-11 DIAGNOSIS — Q85.00 NEUROFIBROMATOSIS (HCC): ICD-10-CM

## 2019-10-11 DIAGNOSIS — E55.9 AVITAMINOSIS D: ICD-10-CM

## 2019-10-11 DIAGNOSIS — R42 DIZZINESS: ICD-10-CM

## 2019-10-11 DIAGNOSIS — R49.0 DYSPHONIA: Primary | ICD-10-CM

## 2019-10-11 PROBLEM — R25.1 TREMORS OF NERVOUS SYSTEM: Status: ACTIVE | Noted: 2019-10-11

## 2019-10-11 PROBLEM — H60.501 ACUTE OTITIS EXTERNA OF RIGHT EAR: Status: RESOLVED | Noted: 2018-06-21 | Resolved: 2019-10-11

## 2019-10-11 PROCEDURE — 99214 OFFICE O/P EST MOD 30 MIN: CPT | Performed by: FAMILY MEDICINE

## 2019-10-11 RX ORDER — MECLIZINE HCL 12.5 MG/1
12.5 TABLET ORAL 3 TIMES DAILY PRN
Qty: 30 TABLET | Refills: 1 | Status: SHIPPED | OUTPATIENT
Start: 2019-10-11 | End: 2020-11-06

## 2019-10-11 RX ORDER — IBUPROFEN 200 MG
200 TABLET ORAL EVERY MORNING
COMMUNITY

## 2019-10-11 NOTE — PROGRESS NOTES
Angelina Harris is a 74 y.o. female.      Assessment/Plan   Problem List Items Addressed This Visit        Digestive    Avitaminosis D       Nervous and Auditory    Neurofibromatosis (CMS/HCC)       Other    Dysphonia - Primary    Dizziness           Trial of meclizine as needed for dizziness  Return in about 6 months (around 4/11/2020), or if symptoms worsen or fail to improve, for Recheck, Next scheduled follow up.      Chief Complaint   Patient presents with   • Dizziness     Social History     Tobacco Use   • Smoking status: Never Smoker   • Smokeless tobacco: Never Used   Substance Use Topics   • Alcohol use: No   • Drug use: No       History of Present Illness   Patient follows up for chronic problems of dizziness vitamin D deficiency dysphonia have been fairly well controlled her dizziness episodes are transient last seconds she will have a up to 5 6 times a day the usually have not bothered her that because it is been long-standing and more than a year however she feels that sometimes she has spread some more frequent on a daily basis and over the last couple weeks has had increasing episodes no no falling but she is concerned that if they last longer that could affect her balance more directly no hearing problems no vision problems she is not up-to-date with her eye exam she has not followed by neurologist anymore she is recently had consultation with cardiology with no significant findings  The following portions of the patient's history were reviewed and updated as appropriate:PMHroutine: Social history , Allergies, Current Medications, Active Problem List and Health Maintenance    Review of Systems   Constitutional: Negative.    HENT: Negative.    Eyes: Negative.    Respiratory: Negative.    Cardiovascular: Negative.    Endocrine: Negative.    Musculoskeletal: Negative.    Neurological: Positive for dizziness and light-headedness.   Hematological: Negative.    Psychiatric/Behavioral: Negative.   "      Objective   Vitals:    10/11/19 1502   BP: 144/82   BP Location: Right arm   Patient Position: Sitting   Cuff Size: Adult   Pulse: 70   Temp: 98.2 °F (36.8 °C)   TempSrc: Oral   SpO2: 98%   Weight: 69.5 kg (153 lb 4.8 oz)   Height: 171.5 cm (67.5\")     Body mass index is 23.66 kg/m².  Physical Exam   Constitutional: She is oriented to person, place, and time. She appears well-developed and well-nourished. No distress.   HENT:   Head: Normocephalic and atraumatic.   Right Ear: External ear normal.   Left Ear: External ear normal.   Mouth/Throat: Oropharynx is clear and moist.   Eyes: Conjunctivae and EOM are normal. Pupils are equal, round, and reactive to light. Right eye exhibits no discharge. Left eye exhibits no discharge. No scleral icterus.   Neck: Normal range of motion. Neck supple. No tracheal deviation present. No thyromegaly present.   Cardiovascular: Normal rate, regular rhythm, normal heart sounds, intact distal pulses and normal pulses. Exam reveals no gallop.   No murmur heard.  Pulmonary/Chest: Effort normal and breath sounds normal. No respiratory distress. She has no wheezes. She has no rales.   Musculoskeletal: Normal range of motion. She exhibits no edema or tenderness.   Neurological: She is alert and oriented to person, place, and time. She exhibits normal muscle tone. Coordination normal.   Multiple subcutaneous nodules throughout the arms   Skin: Skin is warm. No rash noted. No erythema. No pallor.   Psychiatric: She has a normal mood and affect. Her behavior is normal. Judgment and thought content normal.   Nursing note and vitals reviewed.    Reviewed Data:  No visits with results within 1 Month(s) from this visit.   Latest known visit with results is:   Admission on 02/07/2019, Discharged on 02/07/2019   Component Date Value Ref Range Status   • Case Report 02/07/2019    Final                    Value:Surgical Pathology Report                         Case: [id number]                   "                Authorizing Provider:  Shawn Richardson MD   Collected:           02/07/2019 02:37 PM          Ordering Location:     [facility]  Received:            02/07/2019 03:11 PM                                 [facility] OR                                                                       Pathologist:           Bogdan Monreal MD                                                         Specimen:    Eyelid, Left, LEFT LOWER EYELID LESION                                                    • Final Diagnosis 02/07/2019    Final                    Value:This result contains rich text formatting which cannot be displayed here.   • Comment 02/07/2019    Final                    Value:This result contains rich text formatting which cannot be displayed here.   • Gross Description 02/07/2019    Final                    Value:This result contains rich text formatting which cannot be displayed here.   • Microscopic Description 02/07/2019    Final                    Value:This result contains rich text formatting which cannot be displayed here.

## 2020-01-22 ENCOUNTER — OFFICE VISIT (OUTPATIENT)
Dept: INTERNAL MEDICINE | Facility: CLINIC | Age: 75
End: 2020-01-22

## 2020-01-22 VITALS
BODY MASS INDEX: 23.78 KG/M2 | OXYGEN SATURATION: 98 % | HEIGHT: 68 IN | WEIGHT: 156.9 LBS | DIASTOLIC BLOOD PRESSURE: 66 MMHG | TEMPERATURE: 98.1 F | SYSTOLIC BLOOD PRESSURE: 138 MMHG | HEART RATE: 69 BPM

## 2020-01-22 DIAGNOSIS — E55.9 AVITAMINOSIS D: ICD-10-CM

## 2020-01-22 DIAGNOSIS — L20.84 INTRINSIC ECZEMA: ICD-10-CM

## 2020-01-22 DIAGNOSIS — E78.2 MIXED HYPERLIPIDEMIA: ICD-10-CM

## 2020-01-22 DIAGNOSIS — Z12.39 BREAST CANCER SCREENING: ICD-10-CM

## 2020-01-22 DIAGNOSIS — Z12.31 ENCOUNTER FOR SCREENING MAMMOGRAM FOR MALIGNANT NEOPLASM OF BREAST: ICD-10-CM

## 2020-01-22 DIAGNOSIS — R49.0 DYSPHONIA: ICD-10-CM

## 2020-01-22 DIAGNOSIS — Z00.00 MEDICARE ANNUAL WELLNESS VISIT, SUBSEQUENT: Primary | ICD-10-CM

## 2020-01-22 PROCEDURE — G0439 PPPS, SUBSEQ VISIT: HCPCS | Performed by: FAMILY MEDICINE

## 2020-01-22 RX ORDER — NYSTATIN 100000 U/G
CREAM TOPICAL 2 TIMES DAILY
Qty: 30 G | Refills: 1 | Status: SHIPPED | OUTPATIENT
Start: 2020-01-22 | End: 2021-04-08

## 2020-01-22 NOTE — PROGRESS NOTES
The ABCs of the Annual Wellness Visit  Subsequent Medicare Wellness Visit    Chief Complaint   Patient presents with   • [facility] follow up to sinus infection   • Medicare Wellness-subsequent       Subjective   History of Present Illness:  Angelina Harris is a 74 y.o. female who presents for a Subsequent Medicare Wellness Visit.    HEALTH RISK ASSESSMENT    Recent Hospitalizations:  No hospitalization(s) within the last year.    Current Medical Providers:  Patient Care Team:  Octavio Mixon MD as PCP - General (Family Medicine)    Smoking Status:  Social History     Tobacco Use   Smoking Status Never Smoker   Smokeless Tobacco Never Used       Alcohol Consumption:  Social History     Substance and Sexual Activity   Alcohol Use No       Depression Screen:   PHQ-2/PHQ-9 Depression Screening 1/22/2020   Little interest or pleasure in doing things 0   Feeling down, depressed, or hopeless 0   Trouble falling or staying asleep, or sleeping too much -   Feeling tired or having little energy -   Poor appetite or overeating -   Feeling bad about yourself - or that you are a failure or have let yourself or your family down -   Trouble concentrating on things, such as reading the newspaper or watching television -   Moving or speaking so slowly that other people could have noticed. Or the opposite - being so fidgety or restless that you have been moving around a lot more than usual -   Thoughts that you would be better off dead, or of hurting yourself in some way -   Total Score 0   If you checked off any problems, how difficult have these problems made it for you to do your work, take care of things at home, or get along with other people? -       Fall Risk Screen:  STEADI Fall Risk Assessment was completed, and patient is at LOW risk for falls.Assessment completed on:1/22/2020    Health Habits and Functional and Cognitive Screening:  Functional & Cognitive Status 1/22/2020   Do you have difficulty  preparing food and eating? No   Do you have difficulty bathing yourself, getting dressed or grooming yourself? No   Do you have difficulty using the toilet? No   Do you have difficulty moving around from place to place? No   Do you have trouble with steps or getting out of a bed or a chair? No   Current Diet Well Balanced Diet   Dental Exam Up to date   Eye Exam Up to date   Exercise (times per week) 7 times per week   Current Exercise Activities Include Walking   Do you need help using the phone?  No   Are you deaf or do you have serious difficulty hearing?  No   Do you need help with transportation? No   Do you need help shopping? No   Do you need help preparing meals?  No   Do you need help with housework?  No   Do you need help with laundry? No   Do you need help taking your medications? No   Do you need help managing money? No   Do you ever drive or ride in a car without wearing a seat belt? No   Have you felt unusual stress, anger or loneliness in the last month? No   Who do you live with? Alone   If you need help, do you have trouble finding someone available to you? No   Have you been bothered in the last four weeks by sexual problems? No   Do you have difficulty concentrating, remembering or making decisions? No         Does the patient have evidence of cognitive impairment? No    Asprin use counseling:Does not need ASA (and currently is not on it)    Age-appropriate Screening Schedule:  Refer to the list below for future screening recommendations based on patient's age, sex and/or medical conditions. Orders for these recommended tests are listed in the plan section. The patient has been provided with a written plan.    Health Maintenance   Topic Date Due   • TDAP/TD VACCINES (1 - Tdap) 08/[birth year]/1954   • ZOSTER VACCINE (1 of 2) 08/07/1993   • INFLUENZA VACCINE  08/01/2019   • MAMMOGRAM  08/29/2019   • LIPID PANEL  12/18/2019   • COLONOSCOPY  10/23/2020   • DXA SCAN  Discontinued          The following portions  of the patient's history were reviewed and updated as appropriate: allergies, current medications, past family history, past medical history, past social history, past surgical history and problem list.    Outpatient Medications Prior to Visit   Medication Sig Dispense Refill   • acetaminophen (TYLENOL) 500 MG tablet Take 2 tablets by mouth 2 (Two) Times a Day. (Patient taking differently: Take 500 mg by mouth Every Night.) 250 tablet 2   • B Complex Vitamins (VITAMIN B COMPLEX) capsule capsule Take 1 capsule by mouth Daily. 100 tablet 3   • Calcium Carb-Cholecalciferol (CALCIUM 500 + D) 500-200 MG-UNIT tablet Take 2 tablets by mouth Daily. 180 tablet 3   • Cholecalciferol (VITAMIN D) 1000 UNITS capsule Take 1 capsule by mouth 2 (two) times a day.     • Flaxseed, Linseed, (FLAX SEED OIL PO) Take 1 capsule by mouth 2 (two) times a day.     • Glucosamine-Chondroit-Vit C-Mn (GLUCOSAMINE-CHONDROITIN) tablet Take 1 tablet by mouth 2 (Two) Times a Day. 180 each 3   • ibuprofen (ADVIL,MOTRIN) 200 MG tablet Take 200 mg by mouth Every Morning.     • Lactobacillus (ACIDOPHILUS) capsule Take 1 capsule by mouth Daily. 100 each 3   • meclizine (ANTIVERT) 12.5 MG tablet Take 1 tablet by mouth 3 (Three) Times a Day As Needed for dizziness. 30 tablet 1   • Multiple Vitamin (MULTIVITAMIN) capsule Take 1 capsule by mouth Daily. 100 capsule 3   • Psyllium 30.9 % powder Take 1 packet by mouth Daily. 1336 g 9   • cefdinir (OMNICEF) 300 MG capsule Take 1 capsule by mouth 2 (Two) Times a Day. 20 capsule 0     No facility-administered medications prior to visit.        Patient Active Problem List   Diagnosis   • Awareness of heartbeats   • HLD (hyperlipidemia)   • OP (osteoporosis)   • Avitaminosis D   • Plantar fascia syndrome   • Adenomatous polyp   • Near syncope   • Dysphonia   • Atrial tachycardia (CMS/HCC)   • Medicare annual wellness visit, initial   • Arthritis   • Diverticulosis of large intestine without hemorrhage   • Dizziness  "  • Neurofibromatosis (CMS/[facility])   • Medicare annual wellness visit, subsequent   • Breast cancer screening   • Intrinsic eczema       Advanced Care Planning:  Patient does not have an advance directive - information provided to the patient today  Salvador Harris - daughter  Review of Systems    Compared to one year ago, the patient feels her physical health is the same.  Compared to one year ago, the patient feels her mental health is the same.    Reviewed chart for potential of high risk medication in the elderly: yes  Reviewed chart for potential of harmful drug interactions in the elderly:yes    Objective         Vitals:    01/22/20 1143   BP: 138/66   BP Location: Left arm   Patient Position: Sitting   Cuff Size: Adult   Pulse: 69   Temp: 98.1 °F (36.7 °C)   TempSrc: Oral   SpO2: 98%   Weight: 71.2 kg (156 lb 14.4 oz)   Height: 172.7 cm (68\")   PainSc:   4       Body mass index is 23.86 kg/m².  Discussed the patient's BMI with her. The BMI is in the acceptable range.    Physical Exam   Constitutional: She is oriented to person, place, and time. She appears well-developed and well-nourished.   HENT:   Head: Normocephalic and atraumatic.   Nose: Nose normal.   Mouth/Throat: Oropharynx is clear and moist.   Eczematous canals bilaterally   Eyes: Conjunctivae are normal. No scleral icterus.   Neck: Normal range of motion. No thyromegaly present.   Cardiovascular: Normal rate and regular rhythm.   Pulmonary/Chest: Effort normal and breath sounds normal.   Musculoskeletal: She exhibits no edema or tenderness.   Neurological: She is alert and oriented to person, place, and time.   Skin: Skin is warm and dry.   Psychiatric: She has a normal mood and affect. Her behavior is normal. Judgment and thought content normal.   Nursing note and vitals reviewed.            Assessment/Plan   Medicare Risks and Personalized Health Plan  CMS Preventative Services Quick Reference  Advance Directive Discussion  Immunizations " Discussed/Encouraged (specific immunizations; adacel Tdap, Influenza, Pneumococcal 23 and Shingrix )    The above risks/problems have been discussed with the patient.  Pertinent information has been shared with the patient in the After Visit Summary.  Follow up plans and orders are seen below in the Assessment/Plan Section.    Diagnoses and all orders for this visit:    1. Medicare annual wellness visit, subsequent (Primary)    2. Dysphonia    3. Breast cancer screening  -     Mammo Screening Bilateral With CAD; Future    4. Mixed hyperlipidemia    5. Avitaminosis D    6. Intrinsic eczema    7. Encounter for screening mammogram for malignant neoplasm of breast   -     Mammo Screening Bilateral With CAD; Future    Other orders  -     Cancel: Lipid Panel  -     nystatin (MYCOSTATIN) 147676 UNIT/GM cream; Apply  topically to the appropriate area as directed 2 (Two) Times a Day.  Dispense: 30 g; Refill: 1      Follow Up:  Return in about 6 months (around 7/22/2020), or if symptoms worsen or fail to improve, for Recheck, Next scheduled follow up.     An After Visit Summary and PPPS were given to the patient.       Patient refuses treatment for hyperlipidemia longstanding he is going to use hydrocortisone cream for ear canals nystatin for chest wall fungal dermatitis follow-up if symptoms persist

## 2020-02-06 ENCOUNTER — HOSPITAL ENCOUNTER (OUTPATIENT)
Dept: MAMMOGRAPHY | Facility: HOSPITAL | Age: 75
Discharge: HOME | End: 2020-02-06
Admitting: FAMILY MEDICINE

## 2020-02-06 DIAGNOSIS — Z12.39 BREAST CANCER SCREENING: ICD-10-CM

## 2020-02-06 DIAGNOSIS — Z12.31 ENCOUNTER FOR SCREENING MAMMOGRAM FOR MALIGNANT NEOPLASM OF BREAST: ICD-10-CM

## 2020-02-06 PROCEDURE — 77063 BREAST TOMOSYNTHESIS BI: CPT

## 2020-02-06 PROCEDURE — 77067 SCR MAMMO BI INCL CAD: CPT

## 2020-02-11 ENCOUNTER — HOSPITAL ENCOUNTER (OUTPATIENT)
Dept: GENERAL RADIOLOGY | Facility: HOSPITAL | Age: 75
Discharge: HOME | End: 2020-02-11
Admitting: FAMILY MEDICINE

## 2020-02-11 ENCOUNTER — TELEPHONE (OUTPATIENT)
Dept: INTERNAL MEDICINE | Facility: CLINIC | Age: 75
End: 2020-02-11

## 2020-02-11 DIAGNOSIS — M25.561 CHRONIC PAIN OF RIGHT KNEE: ICD-10-CM

## 2020-02-11 DIAGNOSIS — G89.29 CHRONIC PAIN OF RIGHT KNEE: ICD-10-CM

## 2020-02-11 DIAGNOSIS — M25.561 CHRONIC PAIN OF RIGHT KNEE: Primary | ICD-10-CM

## 2020-02-11 DIAGNOSIS — G89.29 CHRONIC PAIN OF RIGHT KNEE: Primary | ICD-10-CM

## 2020-02-11 PROCEDURE — 73560 X-RAY EXAM OF KNEE 1 OR 2: CPT

## 2020-02-11 NOTE — TELEPHONE ENCOUNTER
Patient called stating that she was having trouble with her right knee.  She didn't know whether she should see ortho or if Dr. Mixon could order an xray.  Please advise.

## 2020-02-14 DIAGNOSIS — M25.561 ACUTE PAIN OF RIGHT KNEE: Primary | ICD-10-CM

## 2020-02-28 ENCOUNTER — OFFICE VISIT (OUTPATIENT)
Dept: SPORTS MEDICINE | Facility: CLINIC | Age: 75
End: 2020-02-28

## 2020-02-28 VITALS
HEART RATE: 70 BPM | SYSTOLIC BLOOD PRESSURE: 120 MMHG | BODY MASS INDEX: 23.79 KG/M2 | WEIGHT: 157 LBS | HEIGHT: 68 IN | OXYGEN SATURATION: 97 % | DIASTOLIC BLOOD PRESSURE: 82 MMHG

## 2020-02-28 DIAGNOSIS — S83.001A SUBLUXATION OF RIGHT PATELLA, INITIAL ENCOUNTER: Primary | ICD-10-CM

## 2020-02-28 PROCEDURE — 99214 OFFICE O/P EST MOD 30 MIN: CPT | Performed by: FAMILY MEDICINE

## 2020-02-28 NOTE — PROGRESS NOTES
"Angelina is a 74 y.o. year old female evaluation of a problem that is new to this examiner.    Chief Complaint   Patient presents with   • Right Knee - Initial Evaluation, Pain   • Knee Pain     RT knee pain - reports stiffness - had xrays on 02/11/20 - NKI - chronic pain which has worsened recently - unable to do tasks she used to do without pain - mild limp with gait        History of Present Illness   HPI   Patient is here with complaint of right knee pain.  Patient states a couple of weeks ago she felt like her knee \"went out of joint\".  She had some swelling afterwards.  She then extended her knee and felt like the knee popped and went back in the place.  Since then she feels that her knee is much better.  She does have some anterior knee pain and stiffness with prolonged sitting to standing or with squats or stairs.  She has not had any locking or giving way of the knee.  She had x-ray done of her right knee on 2/11/2020, have reviewed those images and they do show just a very mild degenerative arthritis over the medial and patellofemoral compartments.  I have reviewed the patient's medical, family, and social history in detail and updated the computerized patient record.    Review of Systems   Constitutional: Negative for fever.   Musculoskeletal:        Per HPI   Skin: Negative for wound.   Neurological: Negative for numbness.   All other systems reviewed and are negative.      /82 (BP Location: Left arm, Patient Position: Sitting, Cuff Size: Adult)   Pulse 70   Ht 172.7 cm (67.99\")   Wt 71.2 kg (157 lb)   SpO2 97%   BMI 23.88 kg/m²      Physical Exam   Constitutional: She is oriented to person, place, and time. She appears well-developed and well-nourished.   HENT:   Head: Normocephalic and atraumatic.   Eyes: Pupils are equal, round, and reactive to light. Conjunctivae and EOM are normal.   Cardiovascular:   No peripheral edema   Pulmonary/Chest: Effort normal.   Musculoskeletal:   Right knee " without effusion or erythema.  Patient has full painless range of motion today.  She does have a very mobile patella bilaterally.  Negative apprehension.  Negative Levi.  Negative Thessaly.  Negative Lachman.   Neurological: She is alert and oriented to person, place, and time.   Skin: Skin is warm and dry.   Psychiatric: She has a normal mood and affect. Her behavior is normal.   Vitals reviewed.        Current Outpatient Medications:   •  acetaminophen (TYLENOL) 500 MG tablet, Take 2 tablets by mouth 2 (Two) Times a Day. (Patient taking differently: Take 500 mg by mouth Every Night.), Disp: 250 tablet, Rfl: 2  •  B Complex Vitamins (VITAMIN B COMPLEX) capsule capsule, Take 1 capsule by mouth Daily., Disp: 100 tablet, Rfl: 3  •  Calcium Carb-Cholecalciferol (CALCIUM 500 + D) 500-200 MG-UNIT tablet, Take 2 tablets by mouth Daily., Disp: 180 tablet, Rfl: 3  •  Cholecalciferol (VITAMIN D) 1000 UNITS capsule, Take 1 capsule by mouth 2 (two) times a day., Disp: , Rfl:   •  Flaxseed, Linseed, (FLAX SEED OIL PO), Take 1 capsule by mouth 2 (two) times a day., Disp: , Rfl:   •  Glucosamine-Chondroit-Vit C-Mn (GLUCOSAMINE-CHONDROITIN) tablet, Take 1 tablet by mouth 2 (Two) Times a Day., Disp: 180 each, Rfl: 3  •  ibuprofen (ADVIL,MOTRIN) 200 MG tablet, Take 200 mg by mouth Every Morning., Disp: , Rfl:   •  Lactobacillus (ACIDOPHILUS) capsule, Take 1 capsule by mouth Daily., Disp: 100 each, Rfl: 3  •  meclizine (ANTIVERT) 12.5 MG tablet, Take 1 tablet by mouth 3 (Three) Times a Day As Needed for dizziness., Disp: 30 tablet, Rfl: 1  •  Multiple Vitamin (MULTIVITAMIN) capsule, Take 1 capsule by mouth Daily., Disp: 100 capsule, Rfl: 3  •  nystatin (MYCOSTATIN) 491865 UNIT/GM cream, Apply  topically to the appropriate area as directed 2 (Two) Times a Day., Disp: 30 g, Rfl: 1  •  Psyllium 30.9 % powder, Take 1 packet by mouth Daily., Disp: 1336 g, Rfl: 9     Diagnoses and all orders for this visit:    Subluxation of right  patella, initial encounter       Difficult to get a very good history of what exactly has happened but I think it is possible that with her mobile patella she may have had either a patellar subluxation or a meniscal tear.  She states her knee feels fine now.  We will give her a set of home exercises for the patellofemoral symptoms but if she sees no significant improvement or worsening over the next 4 to 6 weeks then consider MRI right knee    EMR Dragon/Transcription disclaimer:    Much of this encounter note is an electronic transcription/translation of spoken language to printed text.  The electronic translation of spoken language may permit erroneous, or at times, nonsensical words or phrases to be inadvertently transcribed.  Although I have reviewed the note for such errors some may still exist.

## 2020-07-24 ENCOUNTER — TELEPHONE (OUTPATIENT)
Dept: INTERNAL MEDICINE | Facility: CLINIC | Age: 75
End: 2020-07-24

## 2020-07-24 NOTE — TELEPHONE ENCOUNTER
PATIENT NEEDS ORDER FOR TEST ONLY FOR COVID-19 FOR UC     PATIENT HAS NAUSEA, HEADACHE FOR OVER 1 WEEK    PATIENT # [phone]

## 2020-11-06 RX ORDER — MECLIZINE HCL 12.5 MG/1
TABLET ORAL
Qty: 30 TABLET | Refills: 0 | Status: SHIPPED | OUTPATIENT
Start: 2020-11-06 | End: 2021-05-24

## 2021-04-08 RX ORDER — NYSTATIN 100000 U/G
CREAM TOPICAL
Qty: 30 G | Refills: 1 | Status: SHIPPED | OUTPATIENT
Start: 2021-04-08 | End: 2021-10-22

## 2021-05-24 RX ORDER — MECLIZINE HCL 12.5 MG/1
TABLET ORAL
Qty: 30 TABLET | Refills: 0 | Status: SHIPPED | OUTPATIENT
Start: 2021-05-24

## 2021-06-11 NOTE — TELEPHONE ENCOUNTER
ASSESSMENT AND PLAN:   I spent 25 minutes with the patient. 100 % of that time was spent face to face.    Dx: chronic diffuse muscle pain, hyperlipidemia and bmi 42  BECAUSE OF chronic muscle pain and elevated bmi PROBLEMS IT IS STRONGLY ADVISED THAT Lia LOSE WEIGHT.  BELOW IS MY PLAN FOR her     Since Lia has morbid obesity, if conservative management does not work, could consider surgical management in the form of bariatric surgery.       Alicia Kenney MD  - pcp  Start weight 253 lbs, 3/30/2017 214 7/12/2017  Current Goal: maintain 15% weight loss (214 lbs and try to change body composition to improve muscle mass and lose fat).  Ideal body weight: 57 kg (125 lb 10.6 oz)    Prescribed meds: phentermine   Supplements: fish oil, vit D 2000, mvi,   Goals this month: increase protein intake and physical activity to try and preserve muscle mass as she has been losing a lot of muscle and in the long run this will make weight maintenance less sustainable.   Follow up monthly.    Recommended macronutrients;   Calories: 2478-5673 daily  Protein 105 grams per day (She says she gets 80 - 100 most days)  carbs 50-75 grams per day     In body-7/12/2017.  The interbody was done today and was compared to her initial and body done 3/30/2017.  She has lost significant amount of muscle mass as well as fat mass.  Her percent body fat has changed very little from 51% to 50%.  We talked about how concerning this is in terms of long-term weight maintenance.  We talked about the importance of protein intake as well as physical activity to retain muscle mass.    Chief Complaint   Patient presents with     Weight Loss      SUBJECTIVE: Lia Lemon is a 34 y.o. female  comes in for chronic muscle aches diffusely  and weight reduction. She  is RN MANAGEER for a living.  She  has the following hobbies: hanging out with her boys and her .     She feels really good she is excited that she is losing so quickly.    Are you  Patient notified.   "experiencing any side effects to the medications:  Some jitteryness. Tolerable.   Hunger control:  Good.   Exercise was discussed: yes. walking  Taking supplements:  Fish oil, mvi, vit D.  Discussed journaling food: not really anymore.  Patient is pleased with the current results:  Yes.   The patient is following the nutrition plan:  Yes trying.   Barriers to losing weight:  None.      ROS  A comprehensive review of systems was negative.  Pertinent items are noted in HPI.    EXAM  Initial Weight: 253.5 lbs  Weight: 214 lb 8 oz (97.3 kg)  Weight loss from initial: 39  % Weight loss: 15.38 %  Body Fat %: 50.2     /90  Pulse 88  Resp 14  Ht 5' 4.5\" (1.638 m)  Wt 214 lb 8 oz (97.3 kg)  BMI 36.25 kg/m2   Physical Exam   Constitutional: She is oriented to person, place, and time. She appears well-developed and well-nourished.   HENT:   Head: Normocephalic and atraumatic.   Eyes: Conjunctivae are normal.   Cardiovascular: Normal rate and regular rhythm.    Pulmonary/Chest: Effort normal.   Neurological: She is alert and oriented to person, place, and time.   Skin: Skin is warm and dry.   Psychiatric: She has a normal mood and affect.      "

## 2021-06-25 ENCOUNTER — OFFICE VISIT (OUTPATIENT)
Dept: INTERNAL MEDICINE | Facility: CLINIC | Age: 76
End: 2021-06-25

## 2021-06-25 VITALS
SYSTOLIC BLOOD PRESSURE: 140 MMHG | BODY MASS INDEX: 24.48 KG/M2 | HEART RATE: 67 BPM | WEIGHT: 156 LBS | DIASTOLIC BLOOD PRESSURE: 82 MMHG | HEIGHT: 67 IN | OXYGEN SATURATION: 98 %

## 2021-06-25 DIAGNOSIS — R25.1 TREMORS OF NERVOUS SYSTEM: ICD-10-CM

## 2021-06-25 DIAGNOSIS — I83.12 VARICOSE VEINS OF LEFT LOWER EXTREMITY WITH INFLAMMATION: Primary | ICD-10-CM

## 2021-06-25 DIAGNOSIS — M54.41 CHRONIC RIGHT-SIDED LOW BACK PAIN WITH RIGHT-SIDED SCIATICA: ICD-10-CM

## 2021-06-25 DIAGNOSIS — G89.29 CHRONIC RIGHT-SIDED LOW BACK PAIN WITH RIGHT-SIDED SCIATICA: ICD-10-CM

## 2021-06-25 DIAGNOSIS — R49.0 DYSPHONIA: ICD-10-CM

## 2021-06-25 PROCEDURE — 99214 OFFICE O/P EST MOD 30 MIN: CPT | Performed by: FAMILY MEDICINE

## 2021-06-25 RX ORDER — PROPRANOLOL HYDROCHLORIDE 10 MG/1
10 TABLET ORAL 2 TIMES DAILY
Qty: 60 TABLET | Refills: 3 | Status: SHIPPED | OUTPATIENT
Start: 2021-06-25

## 2021-06-25 NOTE — PROGRESS NOTES
"Chief Complaint  skin issue on left leg    Subjective          Angelina Harris presents to [facility]  History of Present Illness  Patient follow-up appointment to discuss chronic medical problems varicose veins dysphonia tremor chronic low back pain.  Her back pain is intermittent she does use ibuprofen once daily as needed she is also had some benefit with her knee with this prescription as well although she is developed some tenderness right popliteal area that she would like looked at she has been using compression socks regularly  She is interested in trying to restart some propanolol for her dysphonia and tremor to see if this would give her some benefit without too many side effects she is hesitant to take any types of prescription medication  Objective   Vital Signs:   /82 (BP Location: Right arm, Patient Position: Sitting, Cuff Size: Adult)   Pulse 67   Ht 170.2 cm (67\")   Wt 70.8 kg (156 lb)   SpO2 98%   BMI 24.43 kg/m²     Physical Exam  HENT:      Head: Normocephalic and atraumatic.      Mouth/Throat:      Comments: Dysphonia  Cardiovascular:      Rate and Rhythm: Normal rate and regular rhythm.      Pulses: Normal pulses.      Heart sounds: Normal heart sounds.      Comments: Varicose veins lower extremities bilaterally some tenderness in the popliteal area with some erythema associated with varicose veins  Pulmonary:      Effort: Pulmonary effort is normal.      Breath sounds: Normal breath sounds.   Neurological:      Mental Status: She is alert. Mental status is at baseline.      Comments: Tremor left greater than right for extremities   Psychiatric:         Mood and Affect: Mood normal.         Behavior: Behavior normal.         Thought Content: Thought content normal.         Judgment: Judgment normal.        Result Review :                 Assessment and Plan    Diagnoses and all orders for this visit:    1. Varicose veins of left lower extremity with " inflammation (Primary)  -     Ambulatory Referral to Vascular Surgery    2. Dysphonia    3. Chronic right-sided low back pain with right-sided sciatica    4. Tremors of nervous system  -     propranolol (INDERAL) 10 MG tablet; Take 1 tablet by mouth 2 (Two) Times a Day.  Dispense: 60 tablet; Refill: 3    Recommend increasing the ibuprofen to 2 times a day she is hesitant to do 3 times a day hopefully this will help her inflammatory markers low back as well as varicose veins she will follow up check benefit of propanolol    Follow Up   Return if symptoms worsen or fail to improve, for Recheck.  Patient was given instructions and counseling regarding her condition or for health maintenance advice. Please see specific information pulled into the AVS if appropriate.

## 2021-08-05 ENCOUNTER — TELEPHONE (OUTPATIENT)
Dept: INTERNAL MEDICINE | Facility: CLINIC | Age: 76
End: 2021-08-05

## 2021-08-05 NOTE — TELEPHONE ENCOUNTER
PATIENT CALLED STATING THAT SHE HAD A TEMPERATURE OF 99 AND WANTED TO KNOW IF SHE SHOULD GET  THE COVID TEST    NO LOSS IN TASTE / SMELL    NO COUGH      CAN YOU CALL PATIENT TO DISCUSS OPTIONS     Angelina Harris (Self) [phone] (H)

## 2021-10-22 RX ORDER — NYSTATIN 100000 U/G
CREAM TOPICAL
Qty: 30 G | Refills: 1 | Status: SHIPPED | OUTPATIENT
Start: 2021-10-22

## 2021-11-20 ENCOUNTER — IMMUNIZATION (OUTPATIENT)
Dept: VACCINE CLINIC | Facility: HOSPITAL | Age: 76
End: 2021-11-20

## 2021-11-20 PROCEDURE — 91300 HC SARSCOV02 VAC 30MCG/0.3ML IM: CPT | Performed by: INTERNAL MEDICINE

## 2021-11-20 PROCEDURE — 0004A ADM SARSCOV2 30MCG/0.3ML BOOSTER: CPT | Performed by: INTERNAL MEDICINE

## 2022-04-29 ENCOUNTER — TELEPHONE (OUTPATIENT)
Dept: INTERNAL MEDICINE | Facility: CLINIC | Age: 77
End: 2022-04-29

## 2022-04-29 NOTE — TELEPHONE ENCOUNTER
Caller: Angelina Harris    Relationship: Self    Best call back number: [phone]    What medication are you requesting: ANTIBIOTIC     What are your current symptoms: CONGESTION, COUGH AND SINUS DRAINAGE     How long have you been experiencing symptoms: LAST Thursday     Have you had these symptoms before:    [x] Yes  [] No    Have you been treated for these symptoms before:   [x] Yes  [] No    If a prescription is needed, what is your preferred pharmacy and phone number:          [facility] [id number] [place] [address] & [facility] - [phone] [facility] [phone] FX

## 2022-05-02 RX ORDER — AMOXICILLIN 500 MG/1
500 CAPSULE ORAL 3 TIMES DAILY
Qty: 30 CAPSULE | Refills: 0 | Status: SHIPPED | OUTPATIENT
Start: 2022-05-02 | End: 2022-11-10

## 2022-05-02 NOTE — TELEPHONE ENCOUNTER
Spoke with patient head congestion minimal cough she is concerned about sinuses sent a prescription for amoxicillin to [facility]

## 2022-05-24 ENCOUNTER — IMMUNIZATION (OUTPATIENT)
Dept: VACCINE CLINIC | Facility: HOSPITAL | Age: 77
End: 2022-05-24

## 2022-05-24 DIAGNOSIS — Z23 NEED FOR VACCINATION: Primary | ICD-10-CM

## 2022-05-24 PROCEDURE — 0054A HC ADM SARSCV2 30MCG TRS-SUCR BOOSTER: CPT | Performed by: INTERNAL MEDICINE

## 2022-05-24 PROCEDURE — 91305 HC SARSCOV2 VAC 30 MCG TRS-SUCR PFIZER: CPT | Performed by: INTERNAL MEDICINE

## 2022-11-01 ENCOUNTER — TELEPHONE (OUTPATIENT)
Dept: INTERNAL MEDICINE | Facility: CLINIC | Age: 77
End: 2022-11-01

## 2022-11-01 NOTE — TELEPHONE ENCOUNTER
Caller: Angelina Harris    Relationship: Self    Best call back number:[phone]    What is the best time to reach you:ANYTIME  Who are you requesting to speak with (clinical staff, provider,  specific staff member):CLINICAL STAFF  Do you know the name of the person who called:SELF  What was the call regarding: PATIENT CALLED AND STATED IF HAVE ANY BOOSTER VACCINE FOR COVID. PLEASE CALL    Do you require a callback:YES   t.”

## 2022-11-10 ENCOUNTER — OFFICE VISIT (OUTPATIENT)
Dept: INTERNAL MEDICINE | Facility: CLINIC | Age: 77
End: 2022-11-10

## 2022-11-10 VITALS
OXYGEN SATURATION: 98 % | HEART RATE: 73 BPM | WEIGHT: 154.4 LBS | HEIGHT: 67 IN | BODY MASS INDEX: 24.23 KG/M2 | TEMPERATURE: 97.3 F | DIASTOLIC BLOOD PRESSURE: 72 MMHG | SYSTOLIC BLOOD PRESSURE: 126 MMHG

## 2022-11-10 DIAGNOSIS — Z12.31 ENCOUNTER FOR SCREENING MAMMOGRAM FOR MALIGNANT NEOPLASM OF BREAST: Primary | ICD-10-CM

## 2022-11-10 DIAGNOSIS — Z00.00 MEDICARE ANNUAL WELLNESS VISIT, SUBSEQUENT: ICD-10-CM

## 2022-11-10 DIAGNOSIS — M81.0 AGE-RELATED OSTEOPOROSIS WITHOUT CURRENT PATHOLOGICAL FRACTURE: ICD-10-CM

## 2022-11-10 DIAGNOSIS — D12.6 ADENOMATOUS POLYP OF COLON, UNSPECIFIED PART OF COLON: ICD-10-CM

## 2022-11-10 PROCEDURE — 1159F MED LIST DOCD IN RCRD: CPT | Performed by: FAMILY MEDICINE

## 2022-11-10 PROCEDURE — 1170F FXNL STATUS ASSESSED: CPT | Performed by: FAMILY MEDICINE

## 2022-11-10 PROCEDURE — G0439 PPPS, SUBSEQ VISIT: HCPCS | Performed by: FAMILY MEDICINE

## 2022-11-10 PROCEDURE — 1125F AMNT PAIN NOTED PAIN PRSNT: CPT | Performed by: FAMILY MEDICINE

## 2022-11-10 NOTE — PROGRESS NOTES
The ABCs of the Annual Wellness Visit  Subsequent Medicare Wellness Visit    Chief Complaint   Patient presents with   • Medicare Wellness-subsequent      Subjective    History of Present Illness:  Angelina Harris is a 77 y.o. female who presents for a Subsequent Medicare Wellness Visit.    The following portions of the patient's history were reviewed and   updated as appropriate: allergies, current medications, past family history, past medical history, past social history, past surgical history and problem list.     Compared to one year ago, the patient feels her physical   health is the same.    Compared to one year ago, the patient feels her mental   health is the same.    Recent Hospitalizations:  She was not admitted to the hospital during the last year.       Current Medical Providers:  Patient Care Team:  Octavio Mixon MD as PCP - General (Family Medicine)    Outpatient Medications Prior to Visit   Medication Sig Dispense Refill   • acetaminophen (TYLENOL) 500 MG tablet Take 2 tablets by mouth 2 (Two) Times a Day. (Patient taking differently: Take 1 tablet by mouth Every Night.) 250 tablet 2   • B Complex Vitamins (VITAMIN B COMPLEX) capsule capsule Take 1 capsule by mouth Daily. 100 tablet 3   • Calcium Carb-Cholecalciferol (CALCIUM 500 + D) 500-200 MG-UNIT tablet Take 2 tablets by mouth Daily. 180 tablet 3   • Cholecalciferol (VITAMIN D) 1000 UNITS capsule Take 1 capsule by mouth 2 (two) times a day.     • Flaxseed, Linseed, (FLAX SEED OIL PO) Take 1 capsule by mouth 2 (two) times a day.     • Glucosamine-Chondroit-Vit C-Mn (GLUCOSAMINE-CHONDROITIN) tablet Take 1 tablet by mouth 2 (Two) Times a Day. 180 each 3   • ibuprofen (ADVIL,MOTRIN) 200 MG tablet Take 200 mg by mouth Every Morning.     • Lactobacillus (ACIDOPHILUS) capsule Take 1 capsule by mouth Daily. 100 each 3   • meclizine (ANTIVERT) 12.5 MG tablet TAKE ONE TABLET BY MOUTH THREE TIMES A DAY AS NEEDED FOR DIZZINESS 30 tablet 0   • Multiple  Vitamin (MULTIVITAMIN) capsule Take 1 capsule by mouth Daily. 100 capsule 3   • Psyllium 30.9 % powder Take 1 packet by mouth Daily. 1336 g 9   • nystatin (MYCOSTATIN) 611457 UNIT/GM cream APPLY TO AFFECTED AREA(S) TWO TIMES A DAY AS DIRECTED 30 g 1   • propranolol (INDERAL) 10 MG tablet Take 1 tablet by mouth 2 (Two) Times a Day. 60 tablet 3   • amoxicillin (AMOXIL) 500 MG capsule Take 1 capsule by mouth 3 (Three) Times a Day. 30 capsule 0     No facility-administered medications prior to visit.       No opioid medication identified on active medication list. I have reviewed chart for other potential  high risk medication/s and harmful drug interactions in the elderly.          Aspirin is not on active medication list.  Aspirin use is not indicated based on review of current medical condition/s. Risk of harm outweighs potential benefits.  .    Patient Active Problem List   Diagnosis   • Awareness of heartbeats   • HLD (hyperlipidemia)   • OP (osteoporosis)   • Avitaminosis D   • Plantar fascia syndrome   • Adenomatous polyp   • Near syncope   • Dysphonia   • Atrial tachycardia (HCC)   • Medicare annual wellness visit, initial   • Arthritis   • Diverticulosis of large intestine without hemorrhage   • Dizziness   • Neurofibromatosis (HCC)   • Medicare annual wellness visit, subsequent   • Breast cancer screening   • Intrinsic eczema   • Varicose veins of left lower extremity with inflammation   • Chronic right-sided low back pain with right-sided sciatica   • Tremors of nervous system     Advance Care Planning   Advance Directive is not on file.  ACP discussion was held with the patient during this visit. Patient does not have an advance directive, information provided.  Family        Objective       Vitals:    11/10/22 1353   BP: 126/72   BP Location: Left arm   Patient Position: Sitting   Cuff Size: Adult   Pulse: 73   Temp: 97.3 °F (36.3 °C)   TempSrc: Temporal   SpO2: 98%   Weight: 70 kg (154 lb 6.4 oz)   Height:  "170.2 cm (67.01\")   PainSc:   2   PainLoc: Generalized     BMI Readings from Last 1 Encounters:   11/10/22 24.18 kg/m²   BMI is within normal parameters. No follow-up required.    Does the patient have evidence of cognitive impairment? No    Physical Exam            HEALTH RISK ASSESSMENT    Smoking Status:  Social History     Tobacco Use   Smoking Status Never   Smokeless Tobacco Never     Alcohol Consumption:  Social History     Substance and Sexual Activity   Alcohol Use Never     Fall Risk Screen:    STEADI Fall Risk Assessment was completed, and patient is at LOW risk for falls.Assessment completed on:11/10/2022    Depression Screening:  PHQ-2/PHQ-9 Depression Screening 11/10/2022   Retired Total Score -   Little Interest or Pleasure in Doing Things 0-->not at all   Feeling Down, Depressed or Hopeless 0-->not at all   PHQ-9: Brief Depression Severity Measure Score 0       Health Habits and Functional and Cognitive Screening:  Functional & Cognitive Status 11/10/2022   Do you have difficulty preparing food and eating? No   Do you have difficulty bathing yourself, getting dressed or grooming yourself? No   Do you have difficulty using the toilet? No   Do you have difficulty moving around from place to place? No   Do you have trouble with steps or getting out of a bed or a chair? No   Current Diet Well Balanced Diet   Dental Exam Up to date   Eye Exam Up to date   Exercise (times per week) 0 times per week   Current Exercises Include No Regular Exercise   Current Exercise Activities Include -   Do you need help using the phone?  No   Are you deaf or do you have serious difficulty hearing?  No   Do you need help with transportation? No   Do you need help shopping? No   Do you need help preparing meals?  No   Do you need help with housework?  No   Do you need help with laundry? No   Do you need help taking your medications? No   Do you need help managing money? No   Do you ever drive or ride in a car without wearing " a seat belt? No   Have you felt unusual stress, anger or loneliness in the last month? No   Who do you live with? Alone   If you need help, do you have trouble finding someone available to you? No   Have you been bothered in the last four weeks by sexual problems? No   Do you have difficulty concentrating, remembering or making decisions? No       Age-appropriate Screening Schedule:  Refer to the list below for future screening recommendations based on patient's age, sex and/or medical conditions. Orders for these recommended tests are listed in the plan section. The patient has been provided with a written plan.    Health Maintenance   Topic Date Due   • TDAP/TD VACCINES (1 - Tdap) Never done   • ZOSTER VACCINE (1 of 2) Never done   • LIPID PANEL  12/18/2019   • MAMMOGRAM  02/06/2022   • DXA SCAN  Discontinued              Assessment & Plan     CMS Preventative Services Quick Reference  Risk Factors Identified During Encounter  Immunizations Discussed/Encouraged (specific Immunizations; Tdap, Prevnar 20 (Pneumococcal 20-valent conjugate) and Shingrix  The above risks/problems have been discussed with the patient.  Follow up actions/plans if indicated are seen below in the Assessment/Plan Section.  Pertinent information has been shared with the patient in the After Visit Summary.    Diagnoses and all orders for this visit:    1. Encounter for screening mammogram for malignant neoplasm of breast (Primary)  -     Mammo Screening Bilateral With CAD; Future    2. Adenomatous polyp of colon, unspecified part of colon  -     Ambulatory Referral For Screening Colonoscopy    3. Age-related osteoporosis without current pathological fracture  -     DEXA Bone Density Axial; Future    4. Medicare annual wellness visit, subsequent        Follow Up:   Return in about 6 months (around 5/10/2023), or if symptoms worsen or fail to improve, for Recheck.     An After Visit Summary and PPPS were given to the patient.  Ongoing   management of chronic medical problems.

## 2022-12-22 ENCOUNTER — PRE-PROCEDURE SCREENING (OUTPATIENT)
Dept: GASTROENTEROLOGY | Facility: CLINIC | Age: 77
End: 2022-12-22

## 2022-12-22 NOTE — TELEPHONE ENCOUNTER
LAST SCOPE 10/17  IN Epic --Personal history of polyps--Family history of polyps AND colon cancer--No blood thinners--Medications:                acetaminophen (TYLENOL) 500 MG tablet  B Complex Vitamins (VITAMIN B COMPLEX) capsule capsule  Calcium Carb-Cholecalciferol (CALCIUM 500 + D) 500-200 MG-UNIT tablet  Cholecalciferol (VITAMIN D) 1000 UNITS capsule  Flaxseed, Linseed, (FLAX SEED OIL PO)  Glucosamine-Chondroit-Vit C-Mn (GLUCOSAMINE-CHONDROITIN) tablet  ibuprofen (ADVIL,MOTRIN) 200 MG tablet  Lactobacillus (ACIDOPHILUS) capsule  meclizine (ANTIVERT) 12.5 MG tablet  Multiple Vitamin (MULTIVITAMIN) capsule  nystatin (MYCOSTATIN) 403724 UNIT/GM cream  propranolol (INDERAL) 10 MG tablet  Psyllium 30.9 % powder      QUESTIONNAIRE SCEEENING & HAS BEEN SENT TO DOCTOR FOR REVIEW

## 2022-12-23 DIAGNOSIS — D12.6 ADENOMATOUS POLYP OF COLON, UNSPECIFIED PART OF COLON: Primary | ICD-10-CM

## 2022-12-23 DIAGNOSIS — Z80.0 FAMILY HISTORY OF GI MALIGNANCY: ICD-10-CM

## 2023-01-03 ENCOUNTER — TELEPHONE (OUTPATIENT)
Dept: GASTROENTEROLOGY | Facility: CLINIC | Age: 78
End: 2023-01-03

## 2023-01-03 NOTE — TELEPHONE ENCOUNTER
Caller: GIA MAR    Relationship to patient: SELF    Best call back number: [phone]    Chief complaint: PATIENT IS A REFERRAL PT AND WAS SENT A QUESTIONNAIRE AND SHE MAILED IT BACK ON 11/22/22 AND STILL HASN'T HEARD ANYTHING ON GETTING HER COLONOSCOPY SCHEDULED.     Type of visit: COLONOSCOPY    Requested date: ASAP

## 2023-01-12 ENCOUNTER — TELEPHONE (OUTPATIENT)
Dept: GASTROENTEROLOGY | Facility: CLINIC | Age: 78
End: 2023-01-12

## 2023-01-12 NOTE — TELEPHONE ENCOUNTER
Caller: Angelina aHrris    Relationship to patient: Self    Best call back number: [phone]    Chief complaint: PT WANTS TO SCHEDULE COLONOSCOPY    Type of visit: COLONOSCOPY    Requested date: FIRST AVAILABLE

## 2023-02-16 ENCOUNTER — TELEPHONE (OUTPATIENT)
Dept: GASTROENTEROLOGY | Facility: CLINIC | Age: 78
End: 2023-02-16

## 2023-02-16 NOTE — TELEPHONE ENCOUNTER
Caller: Angelina Harris    Relationship to patient: Self    Best call back number: [phone]    Chief complaint: PATIENT STATED THAT PRE-REGISTRATION CALLED AND LEFT HER A MESSAGE TO GET HER SCHEDULED FOR HER COLONOSCOPY. SHE WAS RETURNING THE CALL. UNABLE TO WARM TRANSFER.    Requested date: ASAP

## 2023-03-06 ENCOUNTER — TELEPHONE (OUTPATIENT)
Dept: GASTROENTEROLOGY | Facility: CLINIC | Age: 78
End: 2023-03-06

## 2023-03-06 NOTE — TELEPHONE ENCOUNTER
Pt called and is very frustrated.  She says that she has called multiple times and cannot get through to get her scope scheduled.  Will you please call the pt back.  Thank you

## 2023-03-06 NOTE — TELEPHONE ENCOUNTER
Hub staff attempted to follow warm transfer process and was unsuccessful     Caller: Angelina Harris    Relationship to patient: Self    Best call back number: [phone]    Patient is needing: PT RETURNING CALL TO SCHEDULE TO COLONOSCOPY. PT REPORTS THAT SHE HAS CALLED MULTIPLE TIMES TO GET THIS SCHEDULED. PT IS VERY FRUSTRATED. CALL BACK ANYTIME.

## 2023-03-07 ENCOUNTER — HOSPITAL ENCOUNTER (OUTPATIENT)
Dept: MAMMOGRAPHY | Facility: HOSPITAL | Age: 78
Discharge: HOME | End: 2023-03-07
Payer: MEDICARE

## 2023-03-07 ENCOUNTER — TELEPHONE (OUTPATIENT)
Dept: GASTROENTEROLOGY | Facility: CLINIC | Age: 78
End: 2023-03-07

## 2023-03-07 ENCOUNTER — HOSPITAL ENCOUNTER (OUTPATIENT)
Dept: BONE DENSITY | Facility: HOSPITAL | Age: 78
Discharge: HOME | End: 2023-03-07
Payer: MEDICARE

## 2023-03-07 DIAGNOSIS — M81.0 AGE-RELATED OSTEOPOROSIS WITHOUT CURRENT PATHOLOGICAL FRACTURE: ICD-10-CM

## 2023-03-07 DIAGNOSIS — Z12.31 ENCOUNTER FOR SCREENING MAMMOGRAM FOR MALIGNANT NEOPLASM OF BREAST: ICD-10-CM

## 2023-03-07 PROCEDURE — 77067 SCR MAMMO BI INCL CAD: CPT

## 2023-03-07 PROCEDURE — 77080 DXA BONE DENSITY AXIAL: CPT

## 2023-03-07 PROCEDURE — 77063 BREAST TOMOSYNTHESIS BI: CPT

## 2023-03-07 NOTE — TELEPHONE ENCOUNTER
Pt called and is frustrated because it is taking so long to schedule her scope.  Will you please call the pt back.  Thank you.

## 2023-03-08 PROBLEM — Z80.0 FAMILY HISTORY OF GI MALIGNANCY: Status: ACTIVE | Noted: 2023-03-08

## 2023-03-08 PROBLEM — D12.6 ADENOMATOUS POLYP OF COLON: Status: ACTIVE | Noted: 2023-03-08

## 2023-03-13 RX ORDER — ALENDRONATE SODIUM 70 MG/1
70 TABLET ORAL
Qty: 12 TABLET | Refills: 3 | Status: SHIPPED | OUTPATIENT
Start: 2023-03-13

## 2023-03-21 ENCOUNTER — TELEPHONE (OUTPATIENT)
Dept: GASTROENTEROLOGY | Facility: CLINIC | Age: 78
End: 2023-03-21
Payer: MEDICARE

## 2023-04-27 ENCOUNTER — ANESTHESIA (OUTPATIENT)
Dept: GASTROENTEROLOGY | Facility: HOSPITAL | Age: 78
End: 2023-04-27
Payer: MEDICARE

## 2023-04-27 ENCOUNTER — HOSPITAL ENCOUNTER (OUTPATIENT)
Facility: HOSPITAL | Age: 78
Setting detail: HOSPITAL OUTPATIENT SURGERY
Discharge: HOME | End: 2023-04-27
Attending: INTERNAL MEDICINE | Admitting: INTERNAL MEDICINE
Payer: MEDICARE

## 2023-04-27 ENCOUNTER — ANESTHESIA EVENT (OUTPATIENT)
Dept: GASTROENTEROLOGY | Facility: HOSPITAL | Age: 78
End: 2023-04-27
Payer: MEDICARE

## 2023-04-27 VITALS
RESPIRATION RATE: 16 BRPM | SYSTOLIC BLOOD PRESSURE: 103 MMHG | HEIGHT: 66 IN | WEIGHT: 151 LBS | OXYGEN SATURATION: 98 % | BODY MASS INDEX: 24.27 KG/M2 | DIASTOLIC BLOOD PRESSURE: 56 MMHG | HEART RATE: 69 BPM

## 2023-04-27 DIAGNOSIS — D12.6 ADENOMATOUS POLYP OF COLON, UNSPECIFIED PART OF COLON: ICD-10-CM

## 2023-04-27 DIAGNOSIS — Z80.0 FAMILY HISTORY OF GI MALIGNANCY: ICD-10-CM

## 2023-04-27 PROCEDURE — 45385 COLONOSCOPY W/LESION REMOVAL: CPT | Performed by: INTERNAL MEDICINE

## 2023-04-27 PROCEDURE — 88305 TISSUE EXAM BY PATHOLOGIST: CPT | Performed by: INTERNAL MEDICINE

## 2023-04-27 PROCEDURE — 25010000002 PROPOFOL 10 MG/ML EMULSION: Performed by: ANESTHESIOLOGY

## 2023-04-27 PROCEDURE — 0 LIDOCAINE 1 % SOLUTION: Performed by: ANESTHESIOLOGY

## 2023-04-27 PROCEDURE — S0260 H&P FOR SURGERY: HCPCS | Performed by: INTERNAL MEDICINE

## 2023-04-27 RX ORDER — LIDOCAINE HYDROCHLORIDE 10 MG/ML
INJECTION, SOLUTION INFILTRATION; PERINEURAL AS NEEDED
Status: DISCONTINUED | OUTPATIENT
Start: 2023-04-27 | End: 2023-04-27 | Stop reason: SURG

## 2023-04-27 RX ORDER — PROPOFOL 10 MG/ML
VIAL (ML) INTRAVENOUS AS NEEDED
Status: DISCONTINUED | OUTPATIENT
Start: 2023-04-27 | End: 2023-04-27 | Stop reason: SURG

## 2023-04-27 RX ORDER — SODIUM CHLORIDE, SODIUM LACTATE, POTASSIUM CHLORIDE, CALCIUM CHLORIDE 600; 310; 30; 20 MG/100ML; MG/100ML; MG/100ML; MG/100ML
30 INJECTION, SOLUTION INTRAVENOUS CONTINUOUS
Status: DISCONTINUED | OUTPATIENT
Start: 2023-04-27 | End: 2023-04-27 | Stop reason: HOSPADM

## 2023-04-27 RX ADMIN — PROPOFOL 70 MG: 10 INJECTION, EMULSION INTRAVENOUS at 12:21

## 2023-04-27 RX ADMIN — PROPOFOL 140 MCG/KG/MIN: 10 INJECTION, EMULSION INTRAVENOUS at 12:21

## 2023-04-27 RX ADMIN — LIDOCAINE HYDROCHLORIDE 30 MG: 10 INJECTION, SOLUTION INFILTRATION; PERINEURAL at 12:21

## 2023-04-27 RX ADMIN — SODIUM CHLORIDE, POTASSIUM CHLORIDE, SODIUM LACTATE AND CALCIUM CHLORIDE 30 ML/HR: 600; 310; 30; 20 INJECTION, SOLUTION INTRAVENOUS at 12:12

## 2023-04-27 NOTE — H&P
[facility]  Pre Procedure History & Physical    Chief Complaint:   Polyps, family history    Subjective     HPI:   77-year-old female presents the endoscopy suite for colonoscopic evaluation.  She has a personal history of polyps and a family history of polyps and colorectal cancer.  Last colonoscopy performed 2017.    Past Medical History:   Past Medical History:   Diagnosis Date   • Adenomatous polyp    • Arthritis    • Diverticular disease    • Fatigue    • GERD (gastroesophageal reflux disease)    • History of mononucleosis    • Hyperlipidemia    • Low back pain    • Near syncope    • Osteopenia    • Tremor    • Tremors of nervous system    • Wrist pain        Past Surgical History:  Past Surgical History:   Procedure Laterality Date   • BREAST EXCISIONAL BIOPSY Right     done prior to 2007; benign   • BREAST SURGERY     • COLONOSCOPY     • ECTROPION REPAIR Left 02/07/2019    Procedure: LEFT LOWER LID EXCISION LESS THAN 1/4 LEFT LOWER LID;  Surgeon: Shawn Richardson MD;  [place] [facility] [place] [facility] [place]  Service: [place]mology   • EYE SURGERY      cataract   • LIPOMA EXCISION         Family History:  Family History   Problem Relation Age of Onset   • Atrial fibrillation Mother    • Stroke Mother    • Vision loss Mother    • Breast cancer Paternal Aunt    • Hearing loss Father    • Malig Hyperthermia Neg Hx        Social History:   reports that she has never smoked. She has never used smokeless tobacco. She reports that she does not drink alcohol and does not use drugs.    Medications:   No medications prior to admission.       Allergies:  Other and Influenza virus vaccine    ROS:    Pertinent items are noted in HPI, all other systems reviewed and negative     Objective     not currently breastfeeding.    Physical Exam   Constitutional: Pt is oriented to person, place, and time and well-developed, well-nourished, and in no distress.   Mouth/Throat: Oropharynx is clear and moist.   Neck:  Normal range of motion.   Cardiovascular: Normal rate, regular rhythm and normal heart sounds.    Pulmonary/Chest: Effort normal and breath sounds normal.   Abdominal: Soft. Nontender  Skin: Skin is warm and dry.   Psychiatric: Mood, memory, affect and judgment normal.     Assessment & Plan     Diagnosis:  Polyps, family history    Anticipated Surgical Procedure  Colonoscopy  The risks, benefits, and alternatives of this procedure have been discussed with the patient or the responsible party- the patient understands and agrees to proceed.

## 2023-04-27 NOTE — ANESTHESIA PREPROCEDURE EVALUATION
Anesthesia Evaluation     Patient summary reviewed and Nursing notes reviewed   NPO Solid Status: > 8 hours  NPO Liquid Status: > 2 hours           Airway   Mallampati: II  TM distance: >3 FB  Neck ROM: full  No difficulty expected  Dental - normal exam     Pulmonary - negative pulmonary ROS and normal exam   Cardiovascular - normal exam    (+) hyperlipidemia,       Neuro/Psych  (+) dizziness/light headedness, tremors,    GI/Hepatic/Renal/Endo    (+)  GERD,      Musculoskeletal     Abdominal    Substance History - negative use     OB/GYN          Other   arthritis,                      Anesthesia Plan    ASA 3     MAC     intravenous induction     Anesthetic plan, risks, benefits, and alternatives have been provided, discussed and informed consent has been obtained with: patient.        CODE STATUS:

## 2023-04-27 NOTE — DISCHARGE INSTRUCTIONS
For the next 24 hours patient needs to be with a responsible adult.    For 24 hours DO NOT drive, operate machinery, appliances, drink alcohol, make important decisions or sign legal documents.    Start with a light or bland diet if you are feeling sick to your stomach otherwise advance to regular diet as tolerated.    Follow recommendations on procedure report if provided by your doctor.    Call Dr Aguilar for problems [phone]    Problems may include but not limited to: large amounts of bleeding, trouble breathing, repeated vomiting, severe unrelieved pain, fever or chills.

## 2023-04-27 NOTE — ANESTHESIA POSTPROCEDURE EVALUATION
"Patient: Angelina Harris    Procedure Summary     Date: 04/27/23 Room / Location:  [facility]    Anesthesia Start: 1215 Anesthesia Stop: 1256    Procedure: COLONOSCOPY into cecum/terminal ileum with polypectomy, apc Diagnosis:       Adenomatous polyp of colon, unspecified part of colon      Family history of GI malignancy      (Adenomatous polyp of colon, unspecified part of colon [D12.6])      (Family history of GI malignancy [Z80.0])    Surgeons: Alvarado Aguilar MD Provider: George Curran MD    Anesthesia Type: MAC ASA Status: 3          Anesthesia Type: MAC    Vitals  Vitals Value Taken Time   BP 94/55 04/27/23 1255   Temp     Pulse 62 04/27/23 1255   Resp 16 04/27/23 1255   SpO2 97 % 04/27/23 1255           Post Anesthesia Care and Evaluation    Patient location during evaluation: bedside  Patient participation: complete - patient participated  Level of consciousness: sleepy but conscious and responsive to verbal stimuli  Pain management: adequate    Airway patency: patent  Anesthetic complications: No anesthetic complications  PONV Status: controlled  Cardiovascular status: acceptable  Respiratory status: acceptable  Hydration status: acceptable    Comments: BP 94/55 (BP Location: Left arm, Patient Position: Lying)   Pulse 62   Resp 16   Ht 167.6 cm (66\")   Wt 68.5 kg (151 lb)   SpO2 97%   BMI 24.37 kg/m²         "

## 2023-04-28 LAB
LAB AP CASE REPORT: NORMAL
LAB AP CLINICAL INFORMATION: NORMAL
PATH REPORT.FINAL DX SPEC: NORMAL
PATH REPORT.GROSS SPEC: NORMAL

## 2023-05-12 ENCOUNTER — TELEPHONE (OUTPATIENT)
Dept: GASTROENTEROLOGY | Facility: CLINIC | Age: 78
End: 2023-05-12
Payer: MEDICARE

## 2023-05-12 NOTE — TELEPHONE ENCOUNTER
----- Message from Alvarado LAN MD sent at 4/30/2023  4:47 PM EDT -----  Regarding: Biopsy results  Okay to call results, would recommend follow-up sigmoidoscopy in 6 to 12 months to confirm complete resection of rectal polyp.  ----- Message -----  From: Lab, Background User  Sent: 4/28/2023   3:58 PM EDT  To: Alvarado LAN MD

## 2023-05-12 NOTE — TELEPHONE ENCOUNTER
Patient notified of results and recommendations and verbalized understanding    Cs recall placed for 10/27/23

## 2023-09-07 ENCOUNTER — TELEPHONE (OUTPATIENT)
Dept: GASTROENTEROLOGY | Facility: CLINIC | Age: 78
End: 2023-09-07
Payer: MEDICARE

## 2023-09-07 NOTE — TELEPHONE ENCOUNTER
Last scope 04/27/2023 in epic-- personal and family hx of polyps-- family hx of colon ca-- no ASA or blood thinners-- medications:    acetaminophen (TYLENOL) 500 MG tablet  B Complex Vitamins (VITAMIN B COMPLEX) capsule capsule  Calcium Carb-Cholecalciferol (CALCIUM 500 + D) 500-200 MG-UNIT tablet  Cholecalciferol (VITAMIN D) 1000 UNITS capsule  Flaxseed, Linseed, (FLAX SEED OIL PO)  Glucosamine-Chondroit-Vit C-Mn (GLUCOSAMINE-CHONDROITIN) tablet  ibuprofen (ADVIL,MOTRIN) 200 MG tablet  Lactobacillus (ACIDOPHILUS) capsule  Multiple Vitamin (MULTIVITAMIN) capsule  Psyllium 30.9 % powder    OA form scanned into media

## 2023-09-08 DIAGNOSIS — D12.8 ADENOMATOUS RECTAL POLYP: Primary | ICD-10-CM

## 2023-09-11 ENCOUNTER — TELEPHONE (OUTPATIENT)
Dept: GASTROENTEROLOGY | Facility: CLINIC | Age: 78
End: 2023-09-11
Payer: MEDICARE

## 2023-09-11 PROBLEM — D12.8 ADENOMATOUS RECTAL POLYP: Status: ACTIVE | Noted: 2023-09-11

## 2023-09-13 ENCOUNTER — TELEPHONE (OUTPATIENT)
Dept: GASTROENTEROLOGY | Facility: CLINIC | Age: 78
End: 2023-09-13

## 2023-09-13 NOTE — TELEPHONE ENCOUNTER
Caller: Angelina Harris    Relationship to patient: Self    Best call back number: [phone]     Chief complaint: TRANSPORTATION    Type of visit: PROCEDURE    Requested date: PATIENT WILL NEED TO KNOW WHAT DATES ARE AVAILABLE, BUT WILL PROBABLY HAVE TO RESCHEDULE FOR AFTER THANKSGIVING.     If rescheduling, when is the original appointment: 11/15/23

## 2023-09-13 NOTE — TELEPHONE ENCOUNTER
Caller: Angelina Harris    Relationship to patient: Self    Best call back number: [phone]     Chief complaint: TRANSPORTATION    Type of visit: PROCEDURE    Requested date: PATIENT WILL NEED TO KNOW WHAT DATES ARE AVAILABLE, BUT WILL PROBABLY HAVE TO RESCHEDULE FOR AFTER THANKSGIVING.     If rescheduling, when is the original appointment: 11/15/23

## 2023-11-13 ENCOUNTER — OFFICE VISIT (OUTPATIENT)
Dept: INTERNAL MEDICINE | Facility: CLINIC | Age: 78
End: 2023-11-13
Payer: MEDICARE

## 2023-11-13 VITALS
OXYGEN SATURATION: 96 % | HEIGHT: 66 IN | SYSTOLIC BLOOD PRESSURE: 134 MMHG | DIASTOLIC BLOOD PRESSURE: 74 MMHG | BODY MASS INDEX: 24.59 KG/M2 | HEART RATE: 67 BPM | TEMPERATURE: 97.5 F | WEIGHT: 153 LBS

## 2023-11-13 DIAGNOSIS — Z00.00 MEDICARE ANNUAL WELLNESS VISIT, SUBSEQUENT: Primary | ICD-10-CM

## 2023-11-13 DIAGNOSIS — K13.79 MOUTH SORE: ICD-10-CM

## 2023-11-13 DIAGNOSIS — Z00.00 HEALTHCARE MAINTENANCE: ICD-10-CM

## 2023-11-13 NOTE — PROGRESS NOTES
Subjective   Angelina Harris is a 78 y.o. female.     Chief Complaint   Patient presents with    Annual Exam         History of Present Illness   Angelina Harris 78 y.o. female who presents for an Annual Wellness Visit.  she has a history of   Patient Active Problem List   Diagnosis    Awareness of heartbeats    HLD (hyperlipidemia)    OP (osteoporosis)    Avitaminosis D    Plantar fascia syndrome    Adenomatous polyp    Near syncope    Dysphonia    Atrial tachycardia    Medicare annual wellness visit, initial    Arthritis    Diverticulosis of large intestine without hemorrhage    Dizziness    Neurofibromatosis    Medicare annual wellness visit, subsequent    Breast cancer screening    Intrinsic eczema    Varicose veins of left lower extremity with inflammation    Chronic right-sided low back pain with right-sided sciatica    Tremors of nervous system    Adenomatous polyp of colon    Family history of GI malignancy    Adenomatous rectal polyp    Healthcare maintenance    Mouth sore   .  she has been feeling well.   I  reviewed health maintenance with her as part of my preventative care plan.  The following portions of the patient's history were reviewed and updated as appropriate: allergies, current medications, past family history, past medical history, past social history, past surgical history, and problem list.  Recent dental work she would like to know if there is any possibility of inflammation or infection persisting since she is about 9 months and that healing has some sensitivity behind her tooth and she has follow-up appoint with her dentist within a month  Review of Systems   Constitutional:  Negative for appetite change, fever and unexpected weight change.   HENT:  Negative for ear pain, facial swelling, sore throat, trouble swallowing and voice change.    Eyes:  Negative for pain and visual disturbance.   Respiratory:  Negative for chest tightness, shortness of breath and wheezing.    Cardiovascular:   Negative for chest pain and palpitations.   Gastrointestinal:  Negative for abdominal pain and blood in stool.   Endocrine: Negative.    Genitourinary:  Negative for difficulty urinating and hematuria.   Musculoskeletal:  Negative for joint swelling.   Neurological:  Negative for tremors, seizures and syncope.   Hematological:  Negative for adenopathy.   Psychiatric/Behavioral: Negative.         Objective   Physical Exam  Vitals and nursing note reviewed.   Constitutional:       Appearance: Normal appearance. She is well-developed. She is not diaphoretic.   HENT:      Head: Normocephalic and atraumatic.      Right Ear: Tympanic membrane, ear canal and external ear normal.      Left Ear: Tympanic membrane, ear canal and external ear normal.      Mouth/Throat:      Comments: Dysphonia  No abnormality seen in the hard palate behind incisors  Eyes:      General: Lids are normal. No scleral icterus.     Extraocular Movements: Extraocular movements intact.      Conjunctiva/sclera: Conjunctivae normal.   Neck:      Thyroid: No thyroid mass or thyromegaly.      Vascular: No carotid bruit or JVD.   Cardiovascular:      Rate and Rhythm: Normal rate and regular rhythm.      Pulses: Normal pulses.           Radial pulses are 2+ on the right side and 2+ on the left side.      Heart sounds: Normal heart sounds. No murmur heard.  Pulmonary:      Effort: Pulmonary effort is normal. No respiratory distress.      Breath sounds: Normal breath sounds.   Abdominal:      Palpations: Abdomen is soft.   Musculoskeletal:      Cervical back: Normal range of motion.      Right lower leg: No edema.      Left lower leg: No edema.   Skin:     General: Skin is warm and dry.      Coloration: Skin is not pale.      Findings: No erythema or rash.   Neurological:      General: No focal deficit present.      Mental Status: She is alert and oriented to person, place, and time.      Sensory: No sensory deficit.      Deep Tendon Reflexes: Reflexes are  normal and symmetric.   Psychiatric:         Mood and Affect: Mood normal.         Behavior: Behavior normal. Behavior is cooperative.         Thought Content: Thought content normal.         Judgment: Judgment normal.         Assessment & Plan   Diagnoses and all orders for this visit:    1. Medicare annual wellness visit, subsequent (Primary)    2. Healthcare maintenance  -     Lipid Panel    3. Mouth sore  -     C-reactive protein  -     CBC & Differential      Continue attempts at healthy lifestyle with calorie appropriate diet and regular physical activity  Education provided regarding early detection screening bone density reveals some osteopenia with slight increased risk for fractures she declines to take Fosamax she is taking calcium plus vitamin D daily  She has screening colonoscopy scheduled within the next month  Follow-up ongoing management of chronic problems otherwise preventatively annually  She declines immunizations including pneumonia and shingles vaccine

## 2023-11-13 NOTE — PROGRESS NOTES
The ABCs of the Annual Wellness Visit  Subsequent Medicare Wellness Visit    Subjective      Angelina Harris is a 78 y.o. female who presents for a Subsequent Medicare Wellness Visit.    The following portions of the patient's history were reviewed and   updated as appropriate: allergies, current medications, past family history, past medical history, past social history, past surgical history, and problem list.    Compared to one year ago, the patient feels her physical   health is the same.    Compared to one year ago, the patient feels her mental   health is the same.    Recent Hospitalizations:  She was not admitted to the hospital during the last year.       Current Medical Providers:  Patient Care Team:  Octavio Mixon MD as PCP - General (Family Medicine)    Outpatient Medications Prior to Visit   Medication Sig Dispense Refill    acetaminophen (TYLENOL) 500 MG tablet Take 2 tablets by mouth 2 (Two) Times a Day. (Patient taking differently: Take 1 tablet by mouth Every Night.) 250 tablet 2    B Complex Vitamins (VITAMIN B COMPLEX) capsule capsule Take 1 capsule by mouth Daily. 100 tablet 3    Calcium Carb-Cholecalciferol (CALCIUM 500 + D) 500-200 MG-UNIT tablet Take 2 tablets by mouth Daily. 180 tablet 3    Cholecalciferol (VITAMIN D) 1000 UNITS capsule Take 1 capsule by mouth 2 (two) times a day.      Flaxseed, Linseed, (FLAX SEED OIL PO) Take 1 capsule by mouth 2 (two) times a day.      Glucosamine-Chondroit-Vit C-Mn (GLUCOSAMINE-CHONDROITIN) tablet Take 1 tablet by mouth 2 (Two) Times a Day. 180 each 3    ibuprofen (ADVIL,MOTRIN) 200 MG tablet Take 1 tablet by mouth Every Morning.      Lactobacillus (ACIDOPHILUS) capsule Take 1 capsule by mouth Daily. 100 each 3    meclizine (ANTIVERT) 12.5 MG tablet TAKE ONE TABLET BY MOUTH THREE TIMES A DAY AS NEEDED FOR DIZZINESS 30 tablet 0    Multiple Vitamin (MULTIVITAMIN) capsule Take 1 capsule by mouth Daily. 100 capsule 3    nystatin (MYCOSTATIN) 213397 UNIT/GM  "cream APPLY TO AFFECTED AREA(S) TWO TIMES A DAY AS DIRECTED 30 g 1    Psyllium 30.9 % powder Take 1 packet by mouth Daily. 1336 g 9    alendronate (Fosamax) 70 MG tablet Take 1 tablet by mouth Every 7 (Seven) Days. 12 tablet 3    propranolol (INDERAL) 10 MG tablet Take 1 tablet by mouth 2 (Two) Times a Day. 60 tablet 3     No facility-administered medications prior to visit.       No opioid medication identified on active medication list. I have reviewed chart for other potential  high risk medication/s and harmful drug interactions in the elderly.        Aspirin is not on active medication list.  Aspirin use is not indicated based on review of current medical condition/s. Risk of harm outweighs potential benefits.  .    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  ACP discussion was held with the patient during this visit. Patient does not have an advance directive, information provided.     Objective    Vitals:    11/13/23 1243   BP: 134/74   Pulse: 67   Temp: 97.5 °F (36.4 °C)   SpO2: 96%   Weight: 69.4 kg (153 lb)   Height: 167.6 cm (65.98\")     Estimated body mass index is 24.71 kg/m² as calculated from the following:    Height as of this " "encounter: 167.6 cm (65.98\").    Weight as of this encounter: 69.4 kg (153 lb).    BMI is within normal parameters. No other follow-up for BMI required.      Does the patient have evidence of cognitive impairment?   No            HEALTH RISK ASSESSMENT    Smoking Status:  Social History     Tobacco Use   Smoking Status Never   Smokeless Tobacco Never     Alcohol Consumption:  Social History     Substance and Sexual Activity   Alcohol Use Never     Fall Risk Screen:    CRISTINAARASH Fall Risk Assessment has not been completed.    Depression Screenin/10/2022     1:59 PM   PHQ-2/PHQ-9 Depression Screening   Little Interest or Pleasure in Doing Things 0-->not at all   Feeling Down, Depressed or Hopeless 0-->not at all   PHQ-9: Brief Depression Severity Measure Score 0       Health Habits and Functional and Cognitive Screenin/6/2023    10:58 AM   Functional & Cognitive Status   Do you have difficulty preparing food and eating? No   Do you have difficulty bathing yourself, getting dressed or grooming yourself? No   Do you have difficulty using the toilet? No   Do you have difficulty moving around from place to place? No   Do you have trouble with steps or getting out of a bed or a chair? No   Current Diet Well Balanced Diet   Dental Exam Up to date   Eye Exam Up to date   Exercise (times per week) Other   Current Exercises Include House Cleaning;Walking;Yard Work   Do you need help using the phone?  No   Are you deaf or do you have serious difficulty hearing?  No   Do you need help to go to places out of walking distance? No   Do you need help shopping? No   Do you need help preparing meals?  No   Do you need help with housework?  No   Do you need help with laundry? No   Do you need help taking your medications? No   Do you need help managing money? No   Do you ever drive or ride in a car without wearing a seat belt? No   Have you felt unusual stress, anger or loneliness in the last month? No   Who do you " live with? Alone   If you need help, do you have trouble finding someone available to you? No   Have you been bothered in the last four weeks by sexual problems? No   Do you have difficulty concentrating, remembering or making decisions? No       Age-appropriate Screening Schedule:  Refer to the list below for future screening recommendations based on patient's age, sex and/or medical conditions. Orders for these recommended tests are listed in the plan section. The patient has been provided with a written plan.    Health Maintenance   Topic Date Due    TDAP/TD VACCINES (1 - Tdap) Never done    ZOSTER VACCINE (1 of 2) Never done    Pneumococcal Vaccine 65+ (1 - PCV) Never done    LIPID PANEL  12/18/2019    COVID-19 Vaccine (6 - 2023-24 season) 09/01/2023    COLORECTAL CANCER SCREENING  10/27/2023    ANNUAL WELLNESS VISIT  11/10/2023    DXA SCAN  Discontinued                  CMS Preventative Services Quick Reference  Risk Factors Identified During Encounter:    dysphonia    The above risks/problems have been discussed with the patient.  Pertinent information has been shared with the patient in the After Visit Summary.    Diagnoses and all orders for this visit:    1. Medicare annual wellness visit, subsequent (Primary)            Follow Up:   Next Medicare Wellness visit to be scheduled in 1 year.      An After Visit Summary and PPPS were made available to the patient.

## 2023-11-14 LAB
BASOPHILS # BLD AUTO: 0 X10E3/UL (ref 0–0.2)
BASOPHILS NFR BLD AUTO: 1 %
CHOLEST SERPL-MCNC: 254 MG/DL (ref 100–199)
CRP SERPL-MCNC: 1 MG/L (ref 0–10)
EOSINOPHIL # BLD AUTO: 0.1 X10E3/UL (ref 0–0.4)
EOSINOPHIL NFR BLD AUTO: 2 %
ERYTHROCYTE [DISTWIDTH] IN BLOOD BY AUTOMATED COUNT: 12.6 % (ref 11.7–15.4)
HCT VFR BLD AUTO: 38.2 % (ref 34–46.6)
HDLC SERPL-MCNC: 49 MG/DL
HGB BLD-MCNC: 13 G/DL (ref 11.1–15.9)
IMM GRANULOCYTES # BLD AUTO: 0 X10E3/UL (ref 0–0.1)
IMM GRANULOCYTES NFR BLD AUTO: 0 %
LDLC SERPL CALC-MCNC: 175 MG/DL (ref 0–99)
LYMPHOCYTES # BLD AUTO: 1.1 X10E3/UL (ref 0.7–3.1)
LYMPHOCYTES NFR BLD AUTO: 21 %
MCH RBC QN AUTO: 31.6 PG (ref 26.6–33)
MCHC RBC AUTO-ENTMCNC: 34 G/DL (ref 31.5–35.7)
MCV RBC AUTO: 93 FL (ref 79–97)
MONOCYTES # BLD AUTO: 0.5 X10E3/UL (ref 0.1–0.9)
MONOCYTES NFR BLD AUTO: 10 %
NEUTROPHILS # BLD AUTO: 3.7 X10E3/UL (ref 1.4–7)
NEUTROPHILS NFR BLD AUTO: 66 %
PLATELET # BLD AUTO: 167 X10E3/UL (ref 150–450)
RBC # BLD AUTO: 4.11 X10E6/UL (ref 3.77–5.28)
TRIGL SERPL-MCNC: 165 MG/DL (ref 0–149)
VLDLC SERPL CALC-MCNC: 30 MG/DL (ref 5–40)
WBC # BLD AUTO: 5.4 X10E3/UL (ref 3.4–10.8)

## 2023-12-20 ENCOUNTER — ANESTHESIA (OUTPATIENT)
Dept: GASTROENTEROLOGY | Facility: HOSPITAL | Age: 78
End: 2023-12-20
Payer: MEDICARE

## 2023-12-20 ENCOUNTER — ANESTHESIA EVENT (OUTPATIENT)
Dept: GASTROENTEROLOGY | Facility: HOSPITAL | Age: 78
End: 2023-12-20
Payer: MEDICARE

## 2023-12-20 ENCOUNTER — TELEPHONE (OUTPATIENT)
Dept: GASTROENTEROLOGY | Facility: CLINIC | Age: 78
End: 2023-12-20

## 2023-12-20 ENCOUNTER — HOSPITAL ENCOUNTER (OUTPATIENT)
Facility: HOSPITAL | Age: 78
Setting detail: HOSPITAL OUTPATIENT SURGERY
Discharge: HOME | End: 2023-12-20
Attending: INTERNAL MEDICINE | Admitting: INTERNAL MEDICINE
Payer: MEDICARE

## 2023-12-20 VITALS
OXYGEN SATURATION: 96 % | DIASTOLIC BLOOD PRESSURE: 87 MMHG | BODY MASS INDEX: 24.31 KG/M2 | HEART RATE: 72 BPM | RESPIRATION RATE: 16 BRPM | HEIGHT: 67 IN | SYSTOLIC BLOOD PRESSURE: 110 MMHG | WEIGHT: 154.9 LBS

## 2023-12-20 DIAGNOSIS — D12.8 ADENOMATOUS RECTAL POLYP: ICD-10-CM

## 2023-12-20 PROCEDURE — S0260 H&P FOR SURGERY: HCPCS | Performed by: INTERNAL MEDICINE

## 2023-12-20 PROCEDURE — 25010000002 PROPOFOL 10 MG/ML EMULSION: Performed by: ANESTHESIOLOGY

## 2023-12-20 PROCEDURE — 88305 TISSUE EXAM BY PATHOLOGIST: CPT | Performed by: INTERNAL MEDICINE

## 2023-12-20 PROCEDURE — 45331 SIGMOIDOSCOPY AND BIOPSY: CPT | Performed by: INTERNAL MEDICINE

## 2023-12-20 PROCEDURE — 25810000003 LACTATED RINGERS PER 1000 ML: Performed by: INTERNAL MEDICINE

## 2023-12-20 RX ORDER — PROPOFOL 10 MG/ML
VIAL (ML) INTRAVENOUS CONTINUOUS PRN
Status: DISCONTINUED | OUTPATIENT
Start: 2023-12-20 | End: 2023-12-20 | Stop reason: SURG

## 2023-12-20 RX ORDER — SODIUM CHLORIDE, SODIUM LACTATE, POTASSIUM CHLORIDE, CALCIUM CHLORIDE 600; 310; 30; 20 MG/100ML; MG/100ML; MG/100ML; MG/100ML
30 INJECTION, SOLUTION INTRAVENOUS CONTINUOUS PRN
Status: DISCONTINUED | OUTPATIENT
Start: 2023-12-20 | End: 2023-12-20 | Stop reason: HOSPADM

## 2023-12-20 RX ADMIN — SODIUM CHLORIDE, POTASSIUM CHLORIDE, SODIUM LACTATE AND CALCIUM CHLORIDE 30 ML/HR: 600; 310; 30; 20 INJECTION, SOLUTION INTRAVENOUS at 14:22

## 2023-12-20 RX ADMIN — PROPOFOL 50 MCG/KG/MIN: 10 INJECTION, EMULSION INTRAVENOUS at 14:30

## 2023-12-20 NOTE — DISCHARGE INSTRUCTIONS
For the next 24 hours patient needs to be with a responsible adult.    For 24 hours DO NOT drive, operate machinery, appliances, drink alcohol, make important decisions or sign legal documents.    Start with a light or bland diet if you are feeling sick to your stomach otherwise advance to regular diet as tolerated.    Follow recommendations on procedure report if provided by your doctor.    Call Dr Aguilar for problems [phone]    Problems may include but not limited to: large amounts of bleeding, trouble breathing, repeated vomiting, severe unrelieved pain, fever or chills.

## 2023-12-20 NOTE — ANESTHESIA POSTPROCEDURE EVALUATION
Patient: Angelina Harris    Procedure Summary       Date: 12/20/23 [facility]    Anesthesia Start: 1426 Anesthesia Stop: 1445    Procedure: FLEXIBLE SIGMOIDOSCOPY with cold biopsies Diagnosis:       Adenomatous rectal polyp      (Adenomatous rectal polyp [D12.8])    Surgeons: Alvarado Aguilar MD Provider: Abdirahman Nava MD    Anesthesia Type: MAC ASA Status: 3            Anesthesia Type: MAC    Vitals  Vitals Value Taken Time   BP     Temp     Pulse 66 12/20/23 1445   Resp 16 12/20/23 1444   SpO2 98 % 12/20/23 1445   Vitals shown include unfiled device data.        Post Anesthesia Care and Evaluation    Patient location during evaluation: PACU  Patient participation: complete - patient participated  Level of consciousness: awake and alert  Pain management: adequate    Airway patency: patent  Anesthetic complications: No anesthetic complications    Cardiovascular status: acceptable  Respiratory status: acceptable  Hydration status: acceptable    Comments: --------------------            12/20/23               1444     --------------------   BP:                  Pulse:      65       Resp:       16       SpO2:      98%      --------------------

## 2023-12-20 NOTE — NURSING NOTE
Tap water enemax1 given in pre-op. BM still Formed stools. Dr Aguilar aware. No further orders. Ok to proceed.

## 2023-12-20 NOTE — H&P
[facility]  Pre Procedure History & Physical    Chief Complaint:   Polyps, family history    Subjective     HPI:   This 78-year-old female patient of Dr. Octavio kapoor presents to endoscopy for sigmoidoscopic examination.  This is a follow-up exam since she had full colonoscopy performed in April of this year.  A large rectal polyp defined as a tubulovillous adenoma was removed and this is to follow-up to make sure no residual polyp exist.    Past Medical History:   Past Medical History:   Diagnosis Date    Adenomatous polyp     Arthritis     Breast cancer screening 01/22/2020    Diverticular disease     Fatigue     GERD (gastroesophageal reflux disease)     History of mononucleosis     Hyperlipidemia     Low back pain     Near syncope     Osteopenia     Tremor     Tremors of nervous system     Wrist pain        Past Surgical History:  Past Surgical History:   Procedure Laterality Date    BREAST EXCISIONAL BIOPSY Right     done prior to 2007; benign    BREAST SURGERY      COLONOSCOPY      COLONOSCOPY N/A 4/27/2023    Procedure: COLONOSCOPY into cecum/terminal ileum with polypectomy, apc;  Surgeon: Alvarado Aguilar MD;  Location: [facility] ENDOSCOPY;  Service: Gastroenterology;  Laterality: N/A;  diverticulosis,polyps    ECTROPION REPAIR Left 02/07/2019    Procedure: LEFT LOWER LID EXCISION LESS THAN 1/4 LEFT LOWER LID;  Surgeon: Shawn Richardson MD;  Location: [facility] OR [facility];  Service: Ophthalmology    EYE SURGERY      cataract    LIPOMA EXCISION         Family History:  Family History   Problem Relation Age of Onset    Atrial fibrillation Mother     Stroke Mother     Vision loss Mother     Breast cancer Paternal Aunt     Hearing loss Father     Malig Hyperthermia Neg Hx        Social History:   reports that she has never smoked. She has never used smokeless tobacco. She reports that she does not drink alcohol and does not use drugs.    Medications:   No medications prior to admission.        Allergies:  Influenza virus vaccine and Other    ROS:    Pertinent items are noted in HPI     Objective     not currently breastfeeding.    Physical Exam   Constitutional: Pt is oriented to person, place, and time and well-developed, well-nourished, and in no distress.   Mouth/Throat: Oropharynx is clear and moist.   Neck: Normal range of motion.   Cardiovascular: Normal rate, regular rhythm and normal heart sounds.    Pulmonary/Chest: Effort normal and breath sounds normal.   Abdominal: Soft. Nontender  Skin: Skin is warm and dry.   Psychiatric: Mood, memory, affect and judgment normal.     Assessment & Plan     Diagnosis:  Polyps  Family history    Anticipated Surgical Procedure:  Sigmoidoscopy    The risks, benefits, and alternatives of this procedure have been discussed with the patient or the responsible party- the patient understands and agrees to proceed.

## 2023-12-20 NOTE — ANESTHESIA PREPROCEDURE EVALUATION
Anesthesia Evaluation     Patient summary reviewed and Nursing notes reviewed                Airway   Mallampati: II  TM distance: >3 FB  Neck ROM: full  Dental      Pulmonary - negative pulmonary ROS   Cardiovascular     ECG reviewed  Rhythm: regular  Rate: normal    (+) hyperlipidemia      Neuro/Psych  (+) dizziness/light headedness, tremors, numbness  GI/Hepatic/Renal/Endo    (+) GERD    Musculoskeletal     Abdominal    Substance History - negative use     OB/GYN negative ob/gyn ROS         Other   arthritis,                 Anesthesia Plan    ASA 3     MAC     intravenous induction     Anesthetic plan, risks, benefits, and alternatives have been provided, discussed and informed consent has been obtained with: patient.    CODE STATUS:

## 2023-12-20 NOTE — TELEPHONE ENCOUNTER
DELETE AFTER REVIEWING: Telephone encounter to be sent to the clinical pool     Hub staff attempted to follow warm transfer process and was unsuccessful     Caller: Angelina Harris    Relationship to patient: Self    Best call back number: [phone]     Patient is needing: PATIENT IS SCHEDULED FOR A PROCEDURE TODAY WITH AN ARRIVAL TIME OF 12:30 BUT SHE IS HAVING TROUBLE WITH AN ENEMA.  SHE IS ASKING TO SPEAK WITH SOMEONE WITH OUR CLINICAL TEAM.

## 2023-12-21 LAB
LAB AP CASE REPORT: NORMAL
PATH REPORT.FINAL DX SPEC: NORMAL
PATH REPORT.GROSS SPEC: NORMAL

## 2024-01-04 ENCOUNTER — TELEPHONE (OUTPATIENT)
Dept: INTERNAL MEDICINE | Facility: CLINIC | Age: 79
End: 2024-01-04
Payer: MEDICARE

## 2024-01-04 NOTE — TELEPHONE ENCOUNTER
Caller: Angelina Harris    Relationship: Self    Best call back number: [phone]    What medication are you requesting: ANTIBIOTIC     What are your current symptoms: THICK PHLEGM     How long have you been experiencing symptoms: 1 WEEK    If a prescription is needed, what is your preferred pharmacy and phone number:    [id number] [place], [place] - [address] [address] AT [facility] [address] - [phone] [facility] [phone]  [phone]       Additional notes: PLEASE ADVISE PATIENT IF SHE CAN HAVE THIS CALLED IN. PLEASE CALL PATIENT.

## 2024-01-23 ENCOUNTER — TELEPHONE (OUTPATIENT)
Dept: GASTROENTEROLOGY | Facility: CLINIC | Age: 79
End: 2024-01-23
Payer: MEDICARE

## 2024-01-23 NOTE — TELEPHONE ENCOUNTER
[address]  Dept: [phone]  Dept Fax: [phone]  Loc: [phone]    Visit Date: 8/22/2023    Ms. James Maldonado is a 75 y.o. female  who presented for:  Chief Complaint   Patient presents with    New Patient     NP from Dr. Katiuska Knox       HPI:   76 yo F c hx HTN is here dizziness. Patient has recently seen ophthomology and she was told about hypertension retinopathy. Her BP today is well controlled. At home her BP is also controlled  She is on losartan-hctz. Denies any chest pain, sob, palpitations, lightheadedness, dizziness, orthopnea, PND or pedal edema. She reports excellent exercise tolerance. EKG is Sr, incomplete RBBB. She currently smokes          Current Outpatient Medications:     losartan (COZAAR) 100 MG tablet, Take 1 tablet by mouth daily, Disp: 90 tablet, Rfl: 1    rosuvastatin (CRESTOR) 10 MG tablet, Take 1 tablet by mouth nightly, Disp: 90 tablet, Rfl: 1    meloxicam (MOBIC) 15 MG tablet, TAKE 1 TABLET BY MOUTH EVERY DAY, Disp: , Rfl:     aspirin 81 MG chewable tablet, Take 1 tablet by mouth daily, Disp: 30 tablet, Rfl:     Past Medical History  Bruce Manzano  has a past medical history of Hypertension, Mixed hyperlipidemia, Osteoarthritis of left glenohumeral joint, Rheumatic fever, and Tobacco abuse. Social History  Bruce Manzano  reports that she has been smoking cigarettes. She has a 43.00 pack-year smoking history. She has never used smokeless tobacco. She reports current alcohol use. She reports that she does not use drugs. Family History  Bruce Manzano family history includes Breast Cancer (age of onset: 68) in her mother; Cancer in her father, paternal grandfather, and paternal grandmother; Colon Cancer (age of onset: 80) in her mother; Heart Disease in her father; High Blood Pressure in her mother.     Past Surgical History   Past Surgical History:   Procedure Laterality Date Hub staff attempted to follow warm transfer process and was unsuccessful     Caller: Angelina Harris    Relationship to patient: Self    Best call back number: [phone]    Patient is needing: PT RETURN CALL TO [place] TO GO OVER RESULTS.     ORIGINAL MESSAGE TODAY.

## 2024-01-23 NOTE — TELEPHONE ENCOUNTER
----- Message from Alvarado LAN MD sent at 1/22/2024  3:41 PM EST -----  Regarding: biopsy results  OK to call results, offer F/U FOS in 1 year, office F/U sooner PRN  ----- Message -----  From: Lab, Background User  Sent: 12/21/2023   9:56 AM EST  To: Alvarado LAN MD

## 2024-02-13 ENCOUNTER — OFFICE VISIT (OUTPATIENT)
Dept: INTERNAL MEDICINE | Facility: CLINIC | Age: 79
End: 2024-02-13
Payer: MEDICARE

## 2024-02-13 VITALS
TEMPERATURE: 97.9 F | SYSTOLIC BLOOD PRESSURE: 122 MMHG | HEART RATE: 90 BPM | WEIGHT: 153.1 LBS | DIASTOLIC BLOOD PRESSURE: 68 MMHG | HEIGHT: 67 IN | OXYGEN SATURATION: 98 % | BODY MASS INDEX: 24.03 KG/M2

## 2024-02-13 DIAGNOSIS — E78.2 MIXED HYPERLIPIDEMIA: ICD-10-CM

## 2024-02-13 DIAGNOSIS — M19.90 ARTHRITIS: ICD-10-CM

## 2024-02-13 DIAGNOSIS — L98.9 SKIN LESION OF BACK: ICD-10-CM

## 2024-02-13 DIAGNOSIS — R25.1 TREMOR: ICD-10-CM

## 2024-02-13 DIAGNOSIS — R49.0 DYSPHONIA: Primary | ICD-10-CM

## 2024-04-01 ENCOUNTER — OFFICE VISIT (OUTPATIENT)
Dept: INTERNAL MEDICINE | Facility: CLINIC | Age: 79
End: 2024-04-01
Payer: MEDICARE

## 2024-04-01 ENCOUNTER — HOSPITAL ENCOUNTER (OUTPATIENT)
Dept: GENERAL RADIOLOGY | Facility: HOSPITAL | Age: 79
Discharge: HOME | End: 2024-04-01
Admitting: FAMILY MEDICINE
Payer: MEDICARE

## 2024-04-01 VITALS
HEIGHT: 67 IN | HEART RATE: 71 BPM | BODY MASS INDEX: 25.27 KG/M2 | DIASTOLIC BLOOD PRESSURE: 76 MMHG | SYSTOLIC BLOOD PRESSURE: 128 MMHG | TEMPERATURE: 98.2 F | WEIGHT: 161 LBS | OXYGEN SATURATION: 98 %

## 2024-04-01 DIAGNOSIS — M54.2 NECK PAIN: Primary | ICD-10-CM

## 2024-04-01 DIAGNOSIS — Z12.31 ENCOUNTER FOR SCREENING MAMMOGRAM FOR MALIGNANT NEOPLASM OF BREAST: ICD-10-CM

## 2024-04-01 DIAGNOSIS — M25.521 RIGHT ELBOW PAIN: ICD-10-CM

## 2024-04-01 PROCEDURE — 72040 X-RAY EXAM NECK SPINE 2-3 VW: CPT

## 2024-04-01 RX ORDER — KETOTIFEN FUMARATE 0.35 MG/ML
SOLUTION/ DROPS OPHTHALMIC 2 TIMES DAILY
COMMUNITY

## 2024-04-01 NOTE — PROGRESS NOTES
"Chief Complaint  Neck Pain and Elbow Pain (Left )    Subjective        Angelina Harris presents to [facility] PRIMARY [facility]ARE  History of Present Illness  Patient appointment for chronic neck pain with worsening symptoms no numbness or tingling in her upper extremities  Does have some tenderness in the right elbow it has been going on for last month or so and she points to the medial epicondyle she has numerous lipomas  No fever sweats or chills no known trauma  She has underlying hyperlipidemia controlled with diet she has some intermittent allergic chronic rhinitis well-controlled as well  Objective   Vital Signs:  /76   Pulse 71   Temp 98.2 °F (36.8 °C)   Ht 170.2 cm (67.01\")   Wt 73 kg (161 lb)   SpO2 98%   BMI 25.21 kg/m²   Estimated body mass index is 25.21 kg/m² as calculated from the following:    Height as of this encounter: 170.2 cm (67.01\").    Weight as of this encounter: 73 kg (161 lb).             Physical Exam  Vitals and nursing note reviewed.   Constitutional:       Appearance: Normal appearance.   HENT:      Head: Normocephalic and atraumatic.   Cardiovascular:      Rate and Rhythm: Normal rate and regular rhythm.      Pulses: Normal pulses.      Heart sounds: Normal heart sounds.   Musculoskeletal:      Right elbow: Normal. Deformity present. No effusion or lacerations. Normal range of motion. No tenderness.      Cervical back: Rigidity and tenderness present.      Comments: Initial tenderness medial epicondyle it cannot be reproduced still neurovascular supply is grossly intact   Skin:     General: Skin is warm and dry.   Neurological:      Mental Status: She is alert.   Psychiatric:         Mood and Affect: Mood normal.         Behavior: Behavior normal.         Thought Content: Thought content normal.         Judgment: Judgment normal.        Result Review :      Common labs          11/13/2023    13:17   Common Labs   WBC 5.4    Hemoglobin 13.0    Hematocrit 38.2  " "  Platelets 167    Total Cholesterol 254    Triglycerides 165    HDL Cholesterol 49    LDL Cholesterol  175                   Assessment and Plan     Diagnoses and all orders for this visit:    1. Neck pain (Primary)  -     XR Spine Cervical 2 or 3 View    2. Encounter for screening mammogram for malignant neoplasm of breast  -     Mammo Screening Digital Tomosynthesis Bilateral With CAD; Future    3. Right elbow pain    Follow-up results of testing  Monitor right elbow pain  Okay to use Tylenol for pain relief offered physical therapy as well for neck and elbow pain    This patient has a PCP that is the continuing focal point for all health care services, and the patient sees this physician to be evaluated for neck pain. The inherent complexity that this code () captures is not in the clinical condition itself-- neck pain --but rather the cognitition of the continued responsibility of being the focal point for all needed services for this patient.\"          Follow Up     Return if symptoms worsen or fail to improve, for Recheck.  Patient was given instructions and counseling regarding her condition or for health maintenance advice. Please see specific information pulled into the AVS if appropriate.         "

## 2024-04-09 ENCOUNTER — TELEPHONE (OUTPATIENT)
Dept: INTERNAL MEDICINE | Facility: CLINIC | Age: 79
End: 2024-04-09
Payer: MEDICARE

## 2024-04-09 DIAGNOSIS — M54.2 NECK PAIN: Primary | ICD-10-CM

## 2024-04-09 NOTE — TELEPHONE ENCOUNTER
Hub staff attempted to follow warm transfer process and was unsuccessful     Caller: Angelina Harris    Relationship to patient: Self    Best call back number:[phone]     Patient is needing: PATIENT IS CALLING TO SPEAK WITH KRZYSZTOF.  SHE IS WANTING TO KNOW IF KRZYSZTOF WOULD RETURN HER CALL.    PLEASE ADVISE.

## 2024-04-18 ENCOUNTER — HOSPITAL ENCOUNTER (OUTPATIENT)
Dept: MAMMOGRAPHY | Facility: HOSPITAL | Age: 79
Discharge: HOME | End: 2024-04-18
Admitting: FAMILY MEDICINE
Payer: MEDICARE

## 2024-04-18 DIAGNOSIS — Z12.31 ENCOUNTER FOR SCREENING MAMMOGRAM FOR MALIGNANT NEOPLASM OF BREAST: ICD-10-CM

## 2024-04-18 PROCEDURE — 77067 SCR MAMMO BI INCL CAD: CPT

## 2024-04-18 PROCEDURE — 77063 BREAST TOMOSYNTHESIS BI: CPT

## 2024-04-24 ENCOUNTER — TREATMENT (OUTPATIENT)
Dept: PHYSICAL THERAPY | Facility: CLINIC | Age: 79
End: 2024-04-24
Payer: MEDICARE

## 2024-04-24 DIAGNOSIS — R26.89 BALANCE PROBLEMS: ICD-10-CM

## 2024-04-24 DIAGNOSIS — R25.1 TREMOR DUE TO DISORDER OF CNS: ICD-10-CM

## 2024-04-24 DIAGNOSIS — M54.2 PAIN, NECK: Primary | ICD-10-CM

## 2024-04-24 DIAGNOSIS — G96.9 TREMOR DUE TO DISORDER OF CNS: ICD-10-CM

## 2024-04-24 DIAGNOSIS — M50.30 DEGENERATIVE DISC DISEASE, CERVICAL: ICD-10-CM

## 2024-04-24 NOTE — PROGRESS NOTES
"Physical Therapy Initial Evaluation and Plan of Care  [facility]  [address] [address], [address]  [place] [address]    Patient: Angelina Harris   : 1945  Diagnosis/ICD-10 Code:  Pain, neck [M54.2]  Referring practitioner: Octavio Mixon MD  Today's Date: 2024    Subjective Evaluation    History of Present Illness  Mechanism of injury: Pt reports increasing neck tightness of past several months which has recently been exacerbated by biopsy performed on her upper back last week. 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 Pt states she has been given 25# lifting restriction by MD to protect healing of incision from biopsy. Pt has mod/severe tremor at rest and present throughout BL UE and with speech. Pt has been to Neurologist but is not sure if she has Parkinson's.    Pt being seen primarily for neck pain but also reports having occasional issues for her balance. Follow up for further assessment next session.    Subjective comment: My neck feels very tight and I sometimes have spasms when turning my head.  Patient Occupation: Retired   Precautions and Work Restrictions: 25# lifting restrictionPain  Current pain ratin  At best pain ratin  At worst pain ratin  Quality: pulling, discomfort and tight  Relieving factors: change in position and medications  Aggravating factors: repetitive movement and overhead activity  Progression: no change    Hand dominance: right    Diagnostic Tests  X-ray: normal    Treatments  Previous treatment: medication  Current treatment: physical therapy  Patient Goals  Patient goals for therapy: decreased pain, increased motion, return to sport/leisure activities, increased strength and independence with ADLs/IADLs  Patient goal: less tightness in neck, balance       \"XR FINDINGS: There is multilevel facet hypertrophy. There is multilevel  moderate to substantial " "disc degeneration. No acute compression  deformity or subluxation seen. The odontoid is preserved with a  satisfactory C1-2 alignment and the prevertebral soft tissues are within  normal limits. There is S-shaped scoliosis of the cervical and upper  thoracic spine with the cervical spine convexity towards the right and  upper thoracic spine convexity towards the left. No cervical rib seen.  CONCLUSION: Degenerative change as described above.\" MD Thebert 4/2/24    Objective          Postural Observations  Seated posture: fair  Standing posture: fair      Observations     Left Wrist/Hand   Positive for ulnar drift.     Right Wrist/Hand   Positive for ulnar deviation.     Additional Wrist/Hand Observation Details  Moderate ulnar drift and swollen CMC joints noted BL, possible RA involvement    Palpation   Left   Tenderness of the upper trapezius.     Right Tenderness of the upper trapezius.     Neurological Testing     Sensation   Cervical/Thoracic   Left   Intact: light touch    Right   Intact: light touch    Active Range of Motion   Cervical/Thoracic Spine   Cervical    Flexion: 40 degrees   Extension: 40 degrees   Left rotation: 45 degrees   Right rotation: 55 degrees     Strength/Myotome Testing     Additional Strength Details  UE strength testing limited due to healing incision and lifting restriction.  No gross strength deficits noted.    Tests     Additional Tests Details  NDI 6/50 (mild disability) 04/24/24    Functional Assessment     Comments  Pt reports balance issues, but we did not have time for evaluation of balance today. Follow-up w/ further assessment next visit.     General Comments     Cervical/Thoracic Comments  Upper thoracic incision at midline, approx 2 inches long           Assessment & Plan       Assessment  Impairments: abnormal coordination, abnormal muscle firing, abnormal muscle tone, abnormal or restricted ROM, lacks appropriate home exercise program, pain with function and safety issue "   Functional limitations: carrying objects, lifting, pulling, uncomfortable because of pain, reaching overhead and unable to perform repetitive tasks   Assessment details:     Pt is 77 y/o female w/ moderate CNS resting tremor in cervical musculature and BL UE w/ chief complaint of tightness/discomfort in her neck and painful spasms that sometimes occur w/ end range rotation. Pt also has moderate vocal tremor. Pt had recent operation that left incision at midline (approx 2 inches sup/inf, covered w/ gauze) in lower cervical/upper thoracic region (biopsy for precancerous melanoma) that contributes to cervical tightness and discomfort.    Pt has some possible advanced Parkinsonian symptoms, including resting tremors in BL UE and with speech but is unaware if she has this diagnosis. Could also be essential tremor. Neither diagnosis has been confirmed by medical. We talked about this for some time and she was recommended to talk to neurologist.    Follow-up next visit for further assessment.  Prognosis: fair    Goals  Plan Goals: STw  Pt will have no worsening of s/s.  Pt will have <4/10 pain.  Pt will tolerate initial exercises.    LTw  Pt will improve cervical AROM by 25% grossly in all directions.  Pt will improve NDI score to <5/50.  Pt will improve BBS score by at least 25%.      Plan  Planned modality interventions: TENS, thermotherapy (hydrocollator packs) and ultrasound  Planned therapy interventions: manual therapy, flexibility, functional ROM exercises, home exercise program, joint mobilization, therapeutic activities, strengthening, stretching, spinal/joint mobilization, soft tissue mobilization and neuromuscular re-education  Frequency: 2x week  Duration in visits: 8  Duration in weeks: 4  Treatment plan discussed with: patient        Manual Therapy:    15    mins  83394;  Therapeutic Exercise:    8     mins  55840;     Neuromuscular Verena:    0    mins  61086;    Therapeutic Activity:     0     mins   49476;     Ultrasound                  __0_  mins  03343  Iontophoresis                 0    mins  12803    Electrical Stimulation     0    mins  35231 ([id number])  Traction                         _0  mins  79061     Evaluation Time:     45  mins  Timed Treatment:   23   mins   Total Treatment:     68   mins    PT: AGUS Monreal License Number:  [id number]  Electronically signed by Librado Khan PT, 04/24/24, 2:04 PM EDT    Certification Period: 4/25/2024 thru 7/23/2024  I certify that the therapy services are furnished while this patient is under my care.  The services outlined above are required by this patient, and will be reviewed every 90 days.         Physician Signature:__________________________________________________    PHYSICIAN: Octavio Mixon MD      DATE:     Please sign and return via fax to [email or web address] . Thank you, [facility].    PT SIGNATURE: AGUS Monreal LICENSE: [id number]

## 2024-04-30 ENCOUNTER — TREATMENT (OUTPATIENT)
Dept: PHYSICAL THERAPY | Facility: CLINIC | Age: 79
End: 2024-04-30
Payer: MEDICARE

## 2024-04-30 DIAGNOSIS — R26.89 BALANCE PROBLEMS: ICD-10-CM

## 2024-04-30 DIAGNOSIS — R25.1 TREMOR DUE TO DISORDER OF CNS: ICD-10-CM

## 2024-04-30 DIAGNOSIS — G96.9 TREMOR DUE TO DISORDER OF CNS: ICD-10-CM

## 2024-04-30 DIAGNOSIS — M54.2 PAIN, NECK: Primary | ICD-10-CM

## 2024-04-30 DIAGNOSIS — M50.30 DEGENERATIVE DISC DISEASE, CERVICAL: ICD-10-CM

## 2024-04-30 PROCEDURE — 97112 NEUROMUSCULAR REEDUCATION: CPT

## 2024-04-30 PROCEDURE — 97140 MANUAL THERAPY 1/> REGIONS: CPT

## 2024-04-30 NOTE — PROGRESS NOTES
Physical Therapy Daily Treatment Note  [facility] [place]   [address] [place] [address]  P: [phone] F: [phone]    Patient: Angelina Harris   : 1945  Referring practitioner: Octavio Mixon MD  Date of Initial Visit: Type: THERAPY  Noted: 2024  Today's Date: 2024  Patient seen for 2 sessions       Visit Diagnoses:    ICD-10-CM ICD-9-CM   1. Pain, neck  M54.2 723.1   2. Degenerative disc disease, cervical  M50.30 722.4   3. Tremor due to disorder of CNS  G96.9 781.0    R25.1 349.9   4. Balance problems  R26.89 781.99         Subjective:  Angelina Harris reports: No pain or undue soreness since previous visit. Pt reports that sutures in her upper back are coming out on Friday.       Objective          Active Range of Motion   Cervical/Thoracic Spine   Cervical    Left rotation: 60 degrees   Right rotation: 55 degrees       See Exercise, Manual, and Modality Logs for complete treatment.       Assessment:  Worked on neck and UE stretching today, pt states that Ulnar flossing helped her L elbow, which has been a recurring problem for many years and was pleased. We also spent some time assessing/working on her balance. Pt unable to perform SLS but demonstrates tandem stance for 10s w/ mod difficulty.    Plan:   Continue to monitor and progress.          Timed:         Manual Therapy:    25     mins  92438;     Therapeutic Exercise:    0     mins  30380;     Neuromuscular Verena:    15    mins  98778;    Therapeutic Activity:     0     mins  11373;     Ultrasound:     0     mins  40190;    Ionto                               0    mins  90906    Un-Timed:  Electrical Stimulation:    0     mins  18062 ( );  Traction     0     mins 02514        Timed Treatment:   40   mins   Total Treatment:     40   mins    Librado Khan, PT  [place] License #: [id number]    Physical Therapist

## 2024-05-02 ENCOUNTER — TREATMENT (OUTPATIENT)
Dept: PHYSICAL THERAPY | Facility: CLINIC | Age: 79
End: 2024-05-02
Payer: MEDICARE

## 2024-05-02 DIAGNOSIS — M54.2 PAIN, NECK: Primary | ICD-10-CM

## 2024-05-02 DIAGNOSIS — M50.30 DEGENERATIVE DISC DISEASE, CERVICAL: ICD-10-CM

## 2024-05-02 DIAGNOSIS — R26.89 BALANCE PROBLEMS: ICD-10-CM

## 2024-05-02 DIAGNOSIS — G96.9 TREMOR DUE TO DISORDER OF CNS: ICD-10-CM

## 2024-05-02 DIAGNOSIS — R25.1 TREMOR DUE TO DISORDER OF CNS: ICD-10-CM

## 2024-05-02 PROCEDURE — 97140 MANUAL THERAPY 1/> REGIONS: CPT

## 2024-05-02 PROCEDURE — 97530 THERAPEUTIC ACTIVITIES: CPT

## 2024-05-02 PROCEDURE — 97112 NEUROMUSCULAR REEDUCATION: CPT

## 2024-05-07 ENCOUNTER — TREATMENT (OUTPATIENT)
Dept: PHYSICAL THERAPY | Facility: CLINIC | Age: 79
End: 2024-05-07
Payer: MEDICARE

## 2024-05-07 DIAGNOSIS — M50.30 DEGENERATIVE DISC DISEASE, CERVICAL: ICD-10-CM

## 2024-05-07 DIAGNOSIS — R26.89 BALANCE PROBLEMS: ICD-10-CM

## 2024-05-07 DIAGNOSIS — R25.1 TREMOR DUE TO DISORDER OF CNS: ICD-10-CM

## 2024-05-07 DIAGNOSIS — G96.9 TREMOR DUE TO DISORDER OF CNS: ICD-10-CM

## 2024-05-07 DIAGNOSIS — M54.2 PAIN, NECK: Primary | ICD-10-CM

## 2024-05-09 ENCOUNTER — TREATMENT (OUTPATIENT)
Dept: PHYSICAL THERAPY | Facility: CLINIC | Age: 79
End: 2024-05-09
Payer: MEDICARE

## 2024-05-09 DIAGNOSIS — M54.2 PAIN, NECK: Primary | ICD-10-CM

## 2024-05-09 DIAGNOSIS — R26.89 BALANCE PROBLEMS: ICD-10-CM

## 2024-05-09 DIAGNOSIS — G96.9 TREMOR DUE TO DISORDER OF CNS: ICD-10-CM

## 2024-05-09 DIAGNOSIS — M50.30 DEGENERATIVE DISC DISEASE, CERVICAL: ICD-10-CM

## 2024-05-09 DIAGNOSIS — R25.1 TREMOR DUE TO DISORDER OF CNS: ICD-10-CM

## 2024-05-09 PROCEDURE — 97140 MANUAL THERAPY 1/> REGIONS: CPT

## 2024-05-09 PROCEDURE — 97112 NEUROMUSCULAR REEDUCATION: CPT

## 2024-05-09 PROCEDURE — 97110 THERAPEUTIC EXERCISES: CPT

## 2024-05-09 NOTE — PROGRESS NOTES
Physical Therapy Daily Treatment Note  [facility] [place]   [address] [place] [address]  P: [phone] F: [phone]    Patient: Angelina Harris   : 1945  Referring practitioner: Octavio Mixon MD  Date of Initial Visit: Type: THERAPY  Noted: 2024  Today's Date: 2024  Patient seen for 5 sessions       Visit Diagnoses:    ICD-10-CM ICD-9-CM   1. Pain, neck  M54.2 723.1   2. Degenerative disc disease, cervical  M50.30 722.4   3. Tremor due to disorder of CNS  G96.9 781.0    R25.1 349.9   4. Balance problems  R26.89 781.99         Subjective:  Angelina Harris reports: She felt really good after her last visit but had a severe headache after sitting on the bed  and talking on the phone for an extended time. Pt reports that riding in bouncing car is also uncomfortable and has potential to aggravate neck symptoms.      Objective   Noted relationship between cervical fatigue/discomfort extended periods of talking due to increased tremor.    See Exercise, Manual, and Modality Logs for complete treatment.       Assessment:  Pt's cervical tremor seems to be increased while talking. Suggested that patient arrange pillows to support her head in a reclined position and try talking on speaker phone to relieve cervical rotation and sidebending while on phone.      Plan:   Continue to monitor and progress.          Timed:         Manual Therapy:    30     mins  01269;     Therapeutic Exercise:    15     mins  10963;     Neuromuscular Verena:    15    mins  33104;    Therapeutic Activity:    0  mins  74350;     Ultrasound:     0     mins  86825;    Ionto                               0    mins  00025    Un-Timed:  Electrical Stimulation:    0     mins  58024 (MC );  Traction     0     mins 60176        Timed Treatment:   60   mins   Total Treatment:     60   mins    Librado Khan PT  [place] License #: [id number]    Physical Therapist

## 2024-05-10 ENCOUNTER — OFFICE VISIT (OUTPATIENT)
Dept: NEUROLOGY | Facility: CLINIC | Age: 79
End: 2024-05-10
Payer: MEDICARE

## 2024-05-10 VITALS
OXYGEN SATURATION: 98 % | SYSTOLIC BLOOD PRESSURE: 122 MMHG | BODY MASS INDEX: 25.11 KG/M2 | HEART RATE: 67 BPM | DIASTOLIC BLOOD PRESSURE: 70 MMHG | HEIGHT: 67 IN | WEIGHT: 160 LBS

## 2024-05-10 DIAGNOSIS — G25.0 BENIGN ESSENTIAL TREMOR: Primary | ICD-10-CM

## 2024-05-10 PROCEDURE — 1159F MED LIST DOCD IN RCRD: CPT | Performed by: PSYCHIATRY & NEUROLOGY

## 2024-05-10 PROCEDURE — 1160F RVW MEDS BY RX/DR IN RCRD: CPT | Performed by: PSYCHIATRY & NEUROLOGY

## 2024-05-10 PROCEDURE — 99204 OFFICE O/P NEW MOD 45 MIN: CPT | Performed by: PSYCHIATRY & NEUROLOGY

## 2024-05-14 ENCOUNTER — TREATMENT (OUTPATIENT)
Dept: PHYSICAL THERAPY | Facility: CLINIC | Age: 79
End: 2024-05-14
Payer: MEDICARE

## 2024-05-14 DIAGNOSIS — M50.30 DEGENERATIVE DISC DISEASE, CERVICAL: ICD-10-CM

## 2024-05-14 DIAGNOSIS — G96.9 TREMOR DUE TO DISORDER OF CNS: ICD-10-CM

## 2024-05-14 DIAGNOSIS — M54.2 PAIN, NECK: Primary | ICD-10-CM

## 2024-05-14 DIAGNOSIS — R25.1 TREMOR DUE TO DISORDER OF CNS: ICD-10-CM

## 2024-05-14 NOTE — PROGRESS NOTES
Physical Therapy Daily Treatment Note  [facility] [place]   [address] [place] [address]  P: [phone] F: [phone]    Patient: Angelina Harris   : 1945  Referring practitioner: Octavio Mixon MD  Date of Initial Visit: Type: THERAPY  Noted: 2024  Today's Date: 2024  Patient seen for 6 sessions       Visit Diagnoses:    ICD-10-CM ICD-9-CM   1. Pain, neck  M54.2 723.1   2. Degenerative disc disease, cervical  M50.30 722.4   3. Tremor due to disorder of CNS  G96.9 781.0    R25.1 349.9         Subjective:  Angelina Harris reports: I have been feeling better for a little while after our PT sessions and try to hold the feeling, but it doesn't last very long. I Had appointment with [facility] Cleaning and received official diagnosis of benign essential tremor. Pt reports she was offered topiramate and declined.  Pt was able to cut grass at home without too much soreness.        Objective   Minor improvement w/ cervical stability while doing active exercises.    Pt ed:reviewed her neurology report and discussed BET diagnosis and treatment    See Exercise, Manual, and Modality Logs for complete treatment.       Assessment:  Pt has low activity tolerance and endurance which make last improvements difficult. Pt expresses concern that 5' min on UE ergometer will be irritating, follow up next session. Subjective report indicates pt is pleased w PT progress so far but expresses disappointment in the duration of benefits of treatment which are mostly short-term. She reports to feeling better for a few hours  after a session but her s/s return. Pt may benefit from seeing neuro PT specialist.      Plan:   Continue to monitor and progress.          Timed:         Manual Therapy:    30     mins  12011;     Therapeutic Exercise:    0     mins  98994;     Neuromuscular Verena:    15    mins  73037;    Therapeutic Activity:     15     mins  26494;   reassessment, Pt ed  Ultrasound:      0     mins  98516;    Ionto                               0    mins  01351    Un-Timed:  Electrical Stimulation:    0     mins  11808 ( );  Traction     0     mins 40753        Timed Treatment:   55   mins   Total Treatment:     55   mins    AGUS Monreal License #: [id number]    Physical Therapist

## 2024-05-16 ENCOUNTER — TREATMENT (OUTPATIENT)
Dept: PHYSICAL THERAPY | Facility: CLINIC | Age: 79
End: 2024-05-16
Payer: MEDICARE

## 2024-05-16 DIAGNOSIS — M50.30 DEGENERATIVE DISC DISEASE, CERVICAL: ICD-10-CM

## 2024-05-16 DIAGNOSIS — G96.9 TREMOR DUE TO DISORDER OF CNS: ICD-10-CM

## 2024-05-16 DIAGNOSIS — R26.89 BALANCE PROBLEMS: ICD-10-CM

## 2024-05-16 DIAGNOSIS — M54.2 PAIN, NECK: Primary | ICD-10-CM

## 2024-05-16 DIAGNOSIS — R25.1 TREMOR DUE TO DISORDER OF CNS: ICD-10-CM

## 2024-05-16 PROCEDURE — 97140 MANUAL THERAPY 1/> REGIONS: CPT

## 2024-05-16 PROCEDURE — 97112 NEUROMUSCULAR REEDUCATION: CPT

## 2024-05-16 PROCEDURE — 97530 THERAPEUTIC ACTIVITIES: CPT

## 2024-05-16 NOTE — PROGRESS NOTES
Physical Therapy Daily Treatment Note  [facility]   [address] [place] [address]  P: [phone] F: [phone]    Patient: Angelina Harris   : 1945  Referring practitioner: Octavio Mixon MD  Date of Initial Visit: Type: THERAPY  Noted: 2024  Today's Date: 2024  Patient seen for 7 sessions       Visit Diagnoses:    ICD-10-CM ICD-9-CM   1. Pain, neck  M54.2 723.1   2. Degenerative disc disease, cervical  M50.30 722.4   3. Balance problems  R26.89 781.99   4. Tremor due to disorder of CNS  G96.9 781.0    R25.1 349.9         Subjective:  Angelina Harris reports: Pt states she is feeling ok today, but not as good as last time. Still has discomfort in neck w/ end range rotation.      Objective           General Comments     Cervical/Thoracic Comments  Biopsy incision in lower cervical upper thoracic region is healing nicely. No undue discomfort, swelling, or erythema.       See Exercise, Manual, and Modality Logs for complete treatment.       Assessment:  Pt demonstrates min improvement w/ tone in cervical and shoulder region after STM. We trialed some more advanced resisted shoulder AROM exercises today. Pt reports no negative effect at end of session, follow up next appt.      Plan:   Continue to monitor and progress.          Timed:         Manual Therapy:    25     mins  95652;     Therapeutic Exercise:    0     mins  93918;     Neuromuscular Verena:    15    mins  89001;    Therapeutic Activity:     15     mins  25495;     Ultrasound:     0     mins  03511;    Ionto                               0    mins  53282    Un-Timed:  Electrical Stimulation:    0     mins  00654 ( );  Traction     0     mins 73009        Timed Treatment:   55   mins   Total Treatment:     55   mins    Librado Khan, PT  [place] License #: [id number]    Physical Therapist

## 2024-05-21 ENCOUNTER — TREATMENT (OUTPATIENT)
Dept: PHYSICAL THERAPY | Facility: CLINIC | Age: 79
End: 2024-05-21
Payer: MEDICARE

## 2024-05-21 DIAGNOSIS — R26.89 BALANCE PROBLEMS: ICD-10-CM

## 2024-05-21 DIAGNOSIS — M54.2 PAIN, NECK: Primary | ICD-10-CM

## 2024-05-21 DIAGNOSIS — R25.1 TREMOR DUE TO DISORDER OF CNS: ICD-10-CM

## 2024-05-21 DIAGNOSIS — G96.9 TREMOR DUE TO DISORDER OF CNS: ICD-10-CM

## 2024-05-21 DIAGNOSIS — M50.30 DEGENERATIVE DISC DISEASE, CERVICAL: ICD-10-CM

## 2024-05-21 PROCEDURE — 97530 THERAPEUTIC ACTIVITIES: CPT

## 2024-05-21 PROCEDURE — 97112 NEUROMUSCULAR REEDUCATION: CPT

## 2024-05-21 PROCEDURE — 97140 MANUAL THERAPY 1/> REGIONS: CPT

## 2024-05-21 PROCEDURE — 97110 THERAPEUTIC EXERCISES: CPT

## 2024-05-21 NOTE — PROGRESS NOTES
Physical Therapy Daily Treatment Note  [facility]   [address] [place] [address]  P: [phone] F: [phone]    Patient: Angelina Harris   : 1945  Referring practitioner: Octavio Mixon MD  Date of Initial Visit: Type: THERAPY  Noted: 2024  Today's Date: 2024  Patient seen for 8 sessions       Visit Diagnoses:    ICD-10-CM ICD-9-CM   1. Pain, neck  M54.2 723.1   2. Degenerative disc disease, cervical  M50.30 722.4   3. Balance problems  R26.89 781.99   4. Tremor due to disorder of CNS  G96.9 781.0    R25.1 349.9         Subjective:  Angelina Harris reports: Pt reports feeling ok today. She was able to work in the yard and do some landscaping earlier today. States the warm weather helps with flexibility and think her neck might be doing a little better. She recently purchased a soft neck collar that she has been wearing when she is working at her computer.      Objective          Active Range of Motion   Cervical/Thoracic Spine   Cervical    Flexion: 45 degrees   Extension: 45 degrees   Left rotation: 50 degrees   Right rotation: 55 degrees     Functional Assessment     Comments  Tandem balance: 30s w/ 1-2 touches BL  SLS: R 10s, L 7s        See Exercise, Manual, and Modality Logs for complete treatment.       Assessment:  Pt shows minor improvements with cervical AROM and muscle tone. She would like to continue with therapy and resume hip strengthening and balance regimen.      Plan:   Continue to monitor and progress.          Timed:         Manual Therapy:    15     mins  60407;     Therapeutic Exercise:    15     mins  77502;     Neuromuscular Verena:    15    mins  11569;    Therapeutic Activity:     15     mins  80752;   Reassessment  Ultrasound:     0     mins  50722;    Ionto                               0    mins  31756    Un-Timed:  Electrical Stimulation:    0     mins  52342 (MC );  Traction     0     mins 74259        Timed  Treatment:   60   mins   Total Treatment:     60   mins    Librado Khan, PT  [place] License #: [id number]    Physical Therapist

## 2024-05-30 ENCOUNTER — TREATMENT (OUTPATIENT)
Dept: PHYSICAL THERAPY | Facility: CLINIC | Age: 79
End: 2024-05-30
Payer: MEDICARE

## 2024-05-30 DIAGNOSIS — M54.2 PAIN, NECK: Primary | ICD-10-CM

## 2024-05-30 DIAGNOSIS — M50.30 DEGENERATIVE DISC DISEASE, CERVICAL: ICD-10-CM

## 2024-05-30 DIAGNOSIS — G96.9 TREMOR DUE TO DISORDER OF CNS: ICD-10-CM

## 2024-05-30 DIAGNOSIS — R26.89 BALANCE PROBLEMS: ICD-10-CM

## 2024-05-30 DIAGNOSIS — R25.1 TREMOR DUE TO DISORDER OF CNS: ICD-10-CM

## 2024-05-30 NOTE — PROGRESS NOTES
Physical Therapy Daily Treatment Note  [facility]   [address] [place] [address]  P: [phone] F: [phone]    Patient: Angelina Harris   : 1945  Referring practitioner: Octavio Mixon MD  Date of Initial Visit: Type: THERAPY  Noted: 2024  Today's Date: 2024  Patient seen for 9 sessions       Visit Diagnoses:    ICD-10-CM ICD-9-CM   1. Pain, neck  M54.2 723.1   2. Degenerative disc disease, cervical  M50.30 722.4   3. Balance problems  R26.89 781.99   4. Tremor due to disorder of CNS  G96.9 781.0    R25.1 349.9         Subjective:  Angelina Harris reports: Neck has been hurting more on the right and into her head lately. She was doing some yard work and irritated a bit earlier this week. Pt states that she has been having some nausea and trouble going up/down stairs this week.       Objective   See Exercise, Manual, and Modality Logs for complete treatment.       Assessment:  Pt demonstrates minor progress with balance abilities based on observation during exercise. She reports that PT is helpful in the short/med term and has improved neck s/s for a day or so after treatment.      Plan:   Continue to monitor and progress.          Timed:         Manual Therapy:    15     mins  59017;     Therapeutic Exercise:    15     mins  38741;     Neuromuscular Verena:    15    mins  73821;    Therapeutic Activity:     0     mins  11578;     Ultrasound:     0     mins  56694;    Ionto                               0    mins  10486    Un-Timed:  Electrical Stimulation:    0     mins  83687 ( );  Traction     0     mins 55535        Timed Treatment:   45   mins   Total Treatment:     45   mins    Librado Khan PT  [place] License #: [id number]    Physical Therapist

## 2024-06-04 ENCOUNTER — TREATMENT (OUTPATIENT)
Dept: PHYSICAL THERAPY | Facility: CLINIC | Age: 79
End: 2024-06-04
Payer: MEDICARE

## 2024-06-04 DIAGNOSIS — M50.30 DEGENERATIVE DISC DISEASE, CERVICAL: ICD-10-CM

## 2024-06-04 DIAGNOSIS — M54.2 PAIN, NECK: Primary | ICD-10-CM

## 2024-06-04 DIAGNOSIS — R25.1 TREMOR DUE TO DISORDER OF CNS: ICD-10-CM

## 2024-06-04 DIAGNOSIS — R26.89 BALANCE PROBLEMS: ICD-10-CM

## 2024-06-04 DIAGNOSIS — G96.9 TREMOR DUE TO DISORDER OF CNS: ICD-10-CM

## 2024-06-04 NOTE — PROGRESS NOTES
Physical Therapy Daily Treatment Note  [facility] [place]   [address] [place] [address]  P: [phone] F: [phone]    Patient: Angelina Harris   : 1945  Referring practitioner: Octavio Mixon MD  Date of Initial Visit: Type: THERAPY  Noted: 2024  Today's Date: 2024  Patient seen for 10 sessions       Visit Diagnoses:    ICD-10-CM ICD-9-CM   1. Pain, neck  M54.2 723.1   2. Degenerative disc disease, cervical  M50.30 722.4   3. Balance problems  R26.89 781.99   4. Tremor due to disorder of CNS  G96.9 781.0    R25.1 349.9         Subjective:  Angelina Harris reports: Pt states her neck is feeling ok today. No pain or undue soreness since previous session. She has been wearing her padded brace at home on occasion, but not much.      Objective   See Exercise, Manual, and Modality Logs for complete treatment.       Assessment:  Short session today. Subjective report indicates short/med term relief of neck discomfort with manual stretching and light exercise, but benefits typically do not last longer than a day or so.      Plan:   Continue to monitor and progress.          Timed:         Manual Therapy:    25     mins  90353;     Therapeutic Exercise:    15     mins  50273;     Neuromuscular Verena:    0    mins  58001;    Therapeutic Activity:     0     mins  73049;     Ultrasound:     0     mins  22110;    Ionto                               0    mins  50134    Un-Timed:  Electrical Stimulation:    0     mins  27572 (MC );  Traction     0     mins 29003        Timed Treatment:   40   mins   Total Treatment:     40   mins    Librado Khan, PT  [place] License #: [id number]    Physical Therapist

## 2024-06-06 ENCOUNTER — TREATMENT (OUTPATIENT)
Dept: PHYSICAL THERAPY | Facility: CLINIC | Age: 79
End: 2024-06-06
Payer: MEDICARE

## 2024-06-06 DIAGNOSIS — M54.2 PAIN, NECK: Primary | ICD-10-CM

## 2024-06-06 DIAGNOSIS — R26.89 BALANCE PROBLEMS: ICD-10-CM

## 2024-06-06 DIAGNOSIS — G96.9 TREMOR DUE TO DISORDER OF CNS: ICD-10-CM

## 2024-06-06 DIAGNOSIS — M50.30 DEGENERATIVE DISC DISEASE, CERVICAL: ICD-10-CM

## 2024-06-06 DIAGNOSIS — R25.1 TREMOR DUE TO DISORDER OF CNS: ICD-10-CM

## 2024-06-06 NOTE — PROGRESS NOTES
Physical Therapy Daily Treatment Note  [facility] [place]   [address] [place] [address]  P: [phone] F: [phone]    Patient: Angelina Harris   : 1945  Referring practitioner: Octavio Mixon MD  Date of Initial Visit: Type: THERAPY  Noted: 2024  Today's Date: 2024  Patient seen for 11 sessions       Visit Diagnoses:    ICD-10-CM ICD-9-CM   1. Pain, neck  M54.2 723.1   2. Degenerative disc disease, cervical  M50.30 722.4   3. Balance problems  R26.89 781.99   4. Tremor due to disorder of CNS  G96.9 781.0    R25.1 349.9         Subjective:  Angelina Harris reports: Pt states that she is doing better overall but was extremely stiff this morning and was so tight she had a hard time washing her face.      Objective   See Exercise, Manual, and Modality Logs for complete treatment.       Assessment:  Pt reports minor short-term improvements with neck pain. Balance deficits are minimally improved, partially due to low exercise tolerance.    Plan:   Continue to monitor and progress.          Timed:         Manual Therapy:    25     mins  12276;     Therapeutic Exercise:    15     mins  45738;     Neuromuscular Verena:    15    mins  97316;    Therapeutic Activity:     0     mins  74856;     Ultrasound:     0     mins  67413;    Ionto                               0    mins  28531    Un-Timed:  Electrical Stimulation:    0     mins  82872 (MC );  Traction     0     mins 72153        Timed Treatment:   55   mins   Total Treatment:     55   mins    Librado Khan PT  [place] License #: [id number]    Physical Therapist

## 2024-06-11 ENCOUNTER — TREATMENT (OUTPATIENT)
Dept: PHYSICAL THERAPY | Facility: CLINIC | Age: 79
End: 2024-06-11
Payer: MEDICARE

## 2024-06-11 DIAGNOSIS — M50.30 DEGENERATIVE DISC DISEASE, CERVICAL: ICD-10-CM

## 2024-06-11 DIAGNOSIS — M54.2 PAIN, NECK: Primary | ICD-10-CM

## 2024-06-11 DIAGNOSIS — R26.89 BALANCE PROBLEMS: ICD-10-CM

## 2024-06-11 DIAGNOSIS — R25.1 TREMOR DUE TO DISORDER OF CNS: ICD-10-CM

## 2024-06-11 DIAGNOSIS — G96.9 TREMOR DUE TO DISORDER OF CNS: ICD-10-CM

## 2024-06-11 NOTE — PROGRESS NOTES
Physical Therapy Daily Treatment Note  [facility] [place]   [address] [place] [address]  P: [phone] F: [phone]    Patient: Angelina Harris   : 1945  Referring practitioner: Octavio Mixon MD  Date of Initial Visit: Type: THERAPY  Noted: 2024  Today's Date: 2024  Patient seen for 12 sessions       Visit Diagnoses:    ICD-10-CM ICD-9-CM   1. Pain, neck  M54.2 723.1   2. Degenerative disc disease, cervical  M50.30 722.4   3. Balance problems  R26.89 781.99   4. Tremor due to disorder of CNS  G96.9 781.0    R25.1 349.9         Subjective:  Angelina Harris reports: I think my neck is to the point where I'm getting used to it, I still have twinges in my neck with turning L & R.      Objective          Active Range of Motion   Cervical/Thoracic Spine   Cervical    Flexion: 40 degrees   Extension: 45 degrees   Left rotation: 50 degrees   Right rotation: 50 degrees     Tests     Additional Tests Details  Neck index  24 18%  24 22%  24 12%         See Exercise, Manual, and Modality Logs for complete treatment.       Assessment:  Pt has showed minimal progress with cervical AROM over course of past 12 sessions and NDI scores have not gone down compared to initial eval. Pt has put forth good effort with exercises, but cervical objective measures are not improved.      Plan:   Discharge        Timed:         Manual Therapy:    25     mins  24914;     Therapeutic Exercise:    0 mins  99862;     Neuromuscular Verena:    15    mins  87226;    Therapeutic Activity:     0     mins  22941;     Ultrasound:     0     mins  00600;    Ionto                               0    mins  42883    Un-Timed:  Electrical Stimulation:    0     mins  64463 (MC );  Traction     0     mins 28174        Timed Treatment:   40   mins   Total Treatment:     40   mins    Librado Khan PT  [place] License #: [id number]    Physical Therapist

## 2024-07-22 ENCOUNTER — TELEPHONE (OUTPATIENT)
Dept: GASTROENTEROLOGY | Facility: CLINIC | Age: 79
End: 2024-07-22
Payer: MEDICARE

## 2024-07-22 NOTE — TELEPHONE ENCOUNTER
LAST FLEX SIG 12/20/23  IN EPIC     PERSONAL HX OF POLYPS     FAMILY HX OF POLYPS     FAMILY HX OF COLON CA            LIST OF  MEDICATIONS    IBUPROFEN  MULTI VITAMIN   FLAX OIL  VITAMIN D  PROBIOTIC  IBUPROFEN  CALCIUM  GLUCOSAMINE  VITAMIN B   ACETAMINOPHEN  PSYLLIUM HUSK      OA QUESTIONNAIRE SCANNED IN MEDIA

## 2024-07-25 DIAGNOSIS — D12.8 ADENOMATOUS RECTAL POLYP: Primary | ICD-10-CM

## 2024-09-04 ENCOUNTER — PATIENT ROUNDING (BHMG ONLY) (OUTPATIENT)
Dept: URGENT CARE | Facility: CLINIC | Age: 79
End: 2024-09-04
Payer: MEDICARE

## 2024-09-04 NOTE — ED NOTES
Thank you for letting us care for you at your recent visit to [facility]. We would love to hear about your experience with us. Was this the first time you have visited our location?    We’re always looking for ways to make our patients’ experience even better. Do you have any recommendations on ways we may improve?     Please be on the lookout for a survey about your recent visit from Aneta Monge via text or email. We would greatly appreciate if you could fill that out and turn it back in. We want your voice to be heard and we value your feedback.     Thank you for choosing [facility] for your healthcare needs. I appreciate you taking the time to respond!

## 2024-11-05 ENCOUNTER — TELEPHONE (OUTPATIENT)
Dept: GASTROENTEROLOGY | Facility: CLINIC | Age: 79
End: 2024-11-05
Payer: MEDICARE

## 2024-11-05 NOTE — TELEPHONE ENCOUNTER
Caller: Angelina Harris    Relationship to patient: Self    Best call back number: [phone]    Patient is needing: PT CALLED TO SCHEDULE CLS; OA WAS MAILED BACK IN JULY 2024 BUT WANTED TO SCHEDULE FOR JAN 2025. HUB UNABLE TO SCHEDULE OR TRANSFER TO OFFICE.      PLEASE CONTACT PT TO SCHEDULE

## 2024-11-06 ENCOUNTER — TELEPHONE (OUTPATIENT)
Dept: GASTROENTEROLOGY | Facility: CLINIC | Age: 79
End: 2024-11-06
Payer: MEDICARE

## 2024-11-06 NOTE — TELEPHONE ENCOUNTER
MARGO POWER IN SCHEDULING PT SCHEDULED 12/13/2024 ARRIVING AT 1:00PM FLEX-SIG PREP INFORMATION MAILED TO ADDRESS ON FILE VERIFIED BY THE PT ,OK FOR HUB TO RELAY

## 2024-11-22 ENCOUNTER — OFFICE VISIT (OUTPATIENT)
Dept: INTERNAL MEDICINE | Facility: CLINIC | Age: 79
End: 2024-11-22
Payer: MEDICARE

## 2024-11-22 VITALS
WEIGHT: 149.9 LBS | DIASTOLIC BLOOD PRESSURE: 78 MMHG | TEMPERATURE: 97.1 F | HEIGHT: 67 IN | OXYGEN SATURATION: 98 % | SYSTOLIC BLOOD PRESSURE: 118 MMHG | HEART RATE: 85 BPM | BODY MASS INDEX: 23.53 KG/M2

## 2024-11-22 DIAGNOSIS — R49.0 DYSPHONIA: ICD-10-CM

## 2024-11-22 DIAGNOSIS — E55.9 AVITAMINOSIS D: ICD-10-CM

## 2024-11-22 DIAGNOSIS — H11.433 CONJUNCTIVAL HYPEREMIA, BILATERAL: ICD-10-CM

## 2024-11-22 DIAGNOSIS — R42 DIZZINESS: ICD-10-CM

## 2024-11-22 DIAGNOSIS — Z00.00 HEALTHCARE MAINTENANCE: ICD-10-CM

## 2024-11-22 DIAGNOSIS — Z00.00 MEDICARE ANNUAL WELLNESS VISIT, SUBSEQUENT: Primary | ICD-10-CM

## 2024-11-22 DIAGNOSIS — E78.2 MIXED HYPERLIPIDEMIA: ICD-10-CM

## 2024-11-22 PROBLEM — H57.89 EYE REDNESS: Status: ACTIVE | Noted: 2024-11-22

## 2024-11-22 PROBLEM — I47.19 ATRIAL TACHYCARDIA: Status: RESOLVED | Noted: 2018-03-20 | Resolved: 2024-11-22

## 2024-11-22 RX ORDER — LATANOPROST 50 UG/ML
SOLUTION/ DROPS OPHTHALMIC
COMMUNITY
Start: 2024-10-31

## 2024-11-22 NOTE — PROGRESS NOTES
Subjective   Angelina Harris is a 79 y.o. female.     Chief Complaint   Patient presents with    Dizziness         History of Present Illness   Angelina Harris 79 y.o. female who presents for an Annual Wellness Visit.  she has a history of   Patient Active Problem List   Diagnosis    Awareness of heartbeats    HLD (hyperlipidemia)    OP (osteoporosis)    Avitaminosis D    Plantar fascia syndrome    Adenomatous polyp    Near syncope    Dysphonia    Medicare annual wellness visit, initial    Arthritis    Diverticulosis of large intestine without hemorrhage    Dizziness    Neurofibromatosis    Medicare annual wellness visit, subsequent    Breast cancer screening    Intrinsic eczema    Varicose veins of left lower extremity with inflammation    Chronic right-sided low back pain with right-sided sciatica    Tremors of nervous system    Adenomatous polyp of colon    Family history of GI malignancy    Adenomatous rectal polyp    Healthcare maintenance    Mouth sore    Tremor    Skin lesion of back    Right elbow pain    Eye redness    Conjunctival hyperemia, bilateral   .  she has been feeling fairly well.   I  reviewed health maintenance with her as part of my preventative care plan.  Recommend regular dental and eye exams  The following portions of the patient's history were reviewed and updated as appropriate: allergies, current medications, past family history, past medical history, past social history, past surgical history, and problem list.    Review of Systems   Neurological:  Positive for dizziness.       Objective   Physical Exam  Vitals and nursing note reviewed.   Constitutional:       Appearance: Normal appearance. She is well-developed. She is not diaphoretic.   HENT:      Head: Normocephalic and atraumatic.      Right Ear: Tympanic membrane, ear canal and external ear normal.      Left Ear: Tympanic membrane, ear canal and external ear normal.      Mouth/Throat:      Pharynx: No posterior oropharyngeal erythema.       Comments: Dysphonia  No abnormality seen in the hard palate behind incisors  Eyes:      General: Lids are normal. No scleral icterus.     Extraocular Movements: Extraocular movements intact.      Conjunctiva/sclera: Conjunctivae normal.   Neck:      Thyroid: No thyroid mass or thyromegaly.      Vascular: No carotid bruit or JVD.   Cardiovascular:      Rate and Rhythm: Normal rate and regular rhythm.      Pulses: Normal pulses.           Radial pulses are 2+ on the right side and 2+ on the left side.      Heart sounds: Normal heart sounds. No murmur heard.  Pulmonary:      Effort: Pulmonary effort is normal. No respiratory distress.      Breath sounds: Normal breath sounds.   Abdominal:      Palpations: Abdomen is soft.      Tenderness: There is no right CVA tenderness or left CVA tenderness.   Musculoskeletal:      Cervical back: Normal range of motion.      Right lower leg: No edema.      Left lower leg: No edema.   Skin:     General: Skin is warm and dry.      Coloration: Skin is not pale.      Findings: No erythema or rash.   Neurological:      General: No focal deficit present.      Mental Status: She is alert and oriented to person, place, and time.      Sensory: No sensory deficit.      Deep Tendon Reflexes: Reflexes are normal and symmetric.      Comments: Plus tremor   Psychiatric:         Mood and Affect: Mood normal.         Behavior: Behavior normal. Behavior is cooperative.         Thought Content: Thought content normal.         Judgment: Judgment normal.         Assessment & Plan   Diagnoses and all orders for this visit:      1. Healthcare maintenance  Assessment & Plan:    Continue attempts at healthy lifestyle with calorie appropriate diet and regular physical activity do not decrease caloric intake as you share food with the dog  Education provided regarding prevention of serious illness with immunizations patient declines Prevnar 20  Education provided regarding early detection screening she  has follow-up colonoscopy pending  Follow-up ongoing management of chronic problems otherwise preventively annually

## 2024-11-22 NOTE — PROGRESS NOTES
"Chief Complaint  Dizziness    Subjective        Angelina Harris presents to [facility]  History of Present Illness  Patient appointment discuss recent episode of dizziness lasted a few minutes while she was sitting then subsided no 6 weeks ago nothing new or since she is has distant Holter monitor followed by cardiology with Dr. Jessica back in 2018 no follow-up since  He has no chest pain shortness of breath or increased fatigue or decreased exercise tolerance  Is had an episodes of some reddened diet and being followed by a ophthalmology using some Zaditor antihistamine drops which she has not used in the past  Objective   Vital Signs:  /78   Pulse 85   Temp 97.1 °F (36.2 °C) (Temporal)   Ht 170.2 cm (67.01\")   Wt 68 kg (149 lb 14.4 oz)   SpO2 98%   BMI 23.47 kg/m²   Estimated body mass index is 23.47 kg/m² as calculated from the following:    Height as of this encounter: 170.2 cm (67.01\").    Weight as of this encounter: 68 kg (149 lb 14.4 oz).    BMI is within normal parameters. No other follow-up for BMI required.      Physical Exam  Vitals and nursing note reviewed.   HENT:      Right Ear: Tympanic membrane, ear canal and external ear normal.      Left Ear: Tympanic membrane, ear canal and external ear normal.   Eyes:      General: No scleral icterus.     Conjunctiva/sclera: Conjunctivae normal.   Cardiovascular:      Rate and Rhythm: Normal rate and regular rhythm.      Pulses: Normal pulses.      Heart sounds: Normal heart sounds.   Pulmonary:      Effort: Pulmonary effort is normal.   Abdominal:      Tenderness: There is no right CVA tenderness or left CVA tenderness.   Neurological:      Mental Status: She is alert.   Psychiatric:         Mood and Affect: Mood normal.         Behavior: Behavior normal.         Thought Content: Thought content normal.         Judgment: Judgment normal.        Result Review :    November 2023 hemoglobin 13 hematocrit 38.4 WBCs 5.4       "      Assessment and Plan   Diagnoses and all orders for this visit:      1. Dizziness  Assessment & Plan:    Orders:    Comprehensive Metabolic Panel    TSH      Orders:  -     Comprehensive Metabolic Panel  -     TSH    2. Mixed hyperlipidemia  Assessment & Plan:        3 Conjunctival hyperemia, bilateral  Assessment & Plan:    Orders:    TSH      Orders:  -     TSH  Follow-up results of testing for further evaluation of dizziness recommend consultation with Dr. Jessica if recurrence  Patient declines lipid check           Follow Up   Return in about 1 year (around 11/22/2025), or if symptoms worsen or fail to improve, for Medicare Wellness.  Patient was given instructions and counseling regarding her condition or for health maintenance advice. Please see specific information pulled into the AVS if appropriate.

## 2024-11-22 NOTE — PROGRESS NOTES
Subjective   The ABCs of the Annual Wellness Visit  Medicare Wellness Visit      Angelina Harris is a 79 y.o. patient who presents for a Medicare Wellness Visit.    The following portions of the patient's history were reviewed and   updated as appropriate: allergies, current medications, past family history, past medical history, past social history, past surgical history, and problem list.    Compared to one year ago, the patient's physical   health is the same.  Compared to one year ago, the patient's mental   health is the same.    Recent Hospitalizations:  She was not admitted to the hospital during the last year.     Current Medical Providers:  Patient Care Team:  Octavio Mixon MD as PCP - General (Family Medicine)    Outpatient Medications Prior to Visit   Medication Sig Dispense Refill    acetaminophen (TYLENOL) 500 MG tablet Take 2 tablets by mouth 2 (Two) Times a Day. (Patient taking differently: Take 1 tablet by mouth Every Night.) 250 tablet 2    B Complex Vitamins (VITAMIN B COMPLEX) capsule capsule Take 1 capsule by mouth Daily. 100 tablet 3    Calcium Carb-Cholecalciferol (CALCIUM 500 + D) 500-200 MG-UNIT tablet Take 2 tablets by mouth Daily. 180 tablet 3    Cholecalciferol (VITAMIN D) 1000 UNITS capsule Take 1 capsule by mouth 2 (two) times a day.      Flaxseed, Linseed, (FLAX SEED OIL PO) Take 1 capsule by mouth 2 (two) times a day.      Glucosamine-Chondroit-Vit C-Mn (GLUCOSAMINE-CHONDROITIN) tablet Take 1 tablet by mouth 2 (Two) Times a Day. 180 each 3    ibuprofen (ADVIL,MOTRIN) 200 MG tablet Take 1 tablet by mouth Every Morning.      Ketotifen Fumarate (Zaditor) 0.035 % solution 2 (Two) Times a Day.      Lactobacillus (ACIDOPHILUS) capsule Take 1 capsule by mouth Daily. 100 each 3    latanoprost (XALATAN) 0.005 % ophthalmic solution       Multiple Vitamin (MULTIVITAMIN) capsule Take 1 capsule by mouth Daily. 100 capsule 3    nystatin (MYCOSTATIN) 978117 UNIT/GM cream APPLY TO AFFECTED AREA(S)  "TWO TIMES A DAY AS DIRECTED 30 g 1    Psyllium 30.9 % powder Take 1 packet by mouth Daily. 1336 g 9     No facility-administered medications prior to visit.     No opioid medication identified on active medication list. I have reviewed chart for other potential  high risk medication/s and harmful drug interactions in the elderly.      Aspirin is not on active medication list.  Aspirin use is not indicated based on review of current medical condition/s. Risk of harm outweighs potential benefits.  .    Patient Active Problem List   Diagnosis    Awareness of heartbeats    HLD (hyperlipidemia)    OP (osteoporosis)    Avitaminosis D    Plantar fascia syndrome    Adenomatous polyp    Near syncope    Dysphonia    Medicare annual wellness visit, initial    Arthritis    Diverticulosis of large intestine without hemorrhage    Dizziness    Neurofibromatosis    Medicare annual wellness visit, subsequent    Breast cancer screening    Intrinsic eczema    Varicose veins of left lower extremity with inflammation    Chronic right-sided low back pain with right-sided sciatica    Tremors of nervous system    Adenomatous polyp of colon    Family history of GI malignancy    Adenomatous rectal polyp    Healthcare maintenance    Mouth sore    Tremor    Skin lesion of back    Right elbow pain    Eye redness    Conjunctival hyperemia, bilateral     Advance Care Planning Advance Directive is not on file.  ACP discussion was held with the patient during this visit. Patient does not have an advance directive, information provided.            Objective   Vitals:    11/22/24 1227   BP: 118/78   Pulse: 85   Temp: 97.1 °F (36.2 °C)   TempSrc: Temporal   SpO2: 98%   Weight: 68 kg (149 lb 14.4 oz)   Height: 170.2 cm (67.01\")   PainSc: 0-No pain       Estimated body mass index is 23.47 kg/m² as calculated from the following:    Height as of this encounter: 170.2 cm (67.01\").    Weight as of this encounter: 68 kg (149 lb 14.4 oz).    BMI is within " normal parameters. No other follow-up for BMI required.       Does the patient have evidence of cognitive impairment? No                                                                                               Health  Risk Assessment    Smoking Status:  Social History     Tobacco Use   Smoking Status Never   Smokeless Tobacco Never     Alcohol Consumption:  Social History     Substance and Sexual Activity   Alcohol Use Never       Fall Risk Screen  STEADI Fall Risk Assessment was completed, and patient is at LOW risk for falls.Assessment completed on:2024    Depression Screening   Little interest or pleasure in doing things? Not at all   Feeling down, depressed, or hopeless? Not at all   PHQ-2 Total Score 0      Health Habits and Functional and Cognitive Screenin/22/2024    12:26 PM   Functional & Cognitive Status   Do you have difficulty preparing food and eating? No   Do you have difficulty bathing yourself, getting dressed or grooming yourself? No   Do you have difficulty using the toilet? No   Do you have difficulty moving around from place to place? No   Do you have trouble with steps or getting out of a bed or a chair? No   Current Diet Well Balanced Diet   Dental Exam Up to date   Eye Exam Up to date   Exercise (times per week) 7 times per week   Current Exercises Include House Cleaning   Do you need help using the phone?  No   Are you deaf or do you have serious difficulty hearing?  No   Do you need help to go to places out of walking distance? No   Do you need help shopping? No   Do you need help preparing meals?  No   Do you need help with housework?  No   Do you need help with laundry? No   Do you need help taking your medications? No   Do you need help managing money? No   Do you ever drive or ride in a car without wearing a seat belt? No   Have you felt unusual stress, anger or loneliness in the last month? No   Who do you live with? Alone   If you need help, do you have trouble  finding someone available to you? No   Have you been bothered in the last four weeks by sexual problems? No   Do you have difficulty concentrating, remembering or making decisions? No           Age-appropriate Screening Schedule:  Refer to the list below for future screening recommendations based on patient's age, sex and/or medical conditions. Orders for these recommended tests are listed in the plan section. The patient has been provided with a written plan.    Health Maintenance List  Health Maintenance   Topic Date Due    Pneumococcal Vaccine 65+ (1 of 1 - PCV) Never done    ANNUAL WELLNESS VISIT  11/13/2024    LIPID PANEL  11/13/2024    COLORECTAL CANCER SCREENING  12/20/2024    ZOSTER VACCINE (1 of 2) 11/22/2024 (Originally 8/7/1993)    TDAP/TD VACCINES (1 - Tdap) 02/04/2025 (Originally 8/7/1964)    RSV Vaccine - Adults (1 - 1-dose 75+ series) 11/22/2025 (Originally 8/7/2018)    COVID-19 Vaccine  Completed    MAMMOGRAM  Discontinued    DXA SCAN  Discontinued                                                                                                                                                CMS Preventative Services Quick Reference  Risk Factors Identified During Encounter  Immunizations Discussed/Encouraged: Prevnar 20 (Pneumococcal 20-valent conjugate) patient declines    The above risks/problems have been discussed with the patient.  Pertinent information has been shared with the patient in the After Visit Summary.  An After Visit Summary and PPPS were made available to the patient.    Follow Up:   Next Medicare Wellness visit to be scheduled in 1 year.     Assessment & Plan  Medicare annual wellness visit, subsequent         Healthcare maintenance         Dysphonia         Avitaminosis D         Dizziness    Orders:    Comprehensive Metabolic Panel    TSH    Mixed hyperlipidemia            Conjunctival hyperemia, bilateral    Orders:    TSH         Follow Up:   Return in about 1 year (around  11/22/2025), or if symptoms worsen or fail to improve, for Medicare Wellness.

## 2024-11-23 LAB
ALBUMIN SERPL-MCNC: 4.3 G/DL (ref 3.7–4.7)
ALP SERPL-CCNC: 93 IU/L (ref 44–121)
ALT SERPL-CCNC: 34 IU/L (ref 0–32)
AST SERPL-CCNC: 40 IU/L (ref 0–40)
BILIRUB SERPL-MCNC: 0.4 MG/DL (ref 0–1.2)
BUN SERPL-MCNC: 25 MG/DL (ref 8–27)
BUN/CREAT SERPL: 21 (ref 12–28)
CALCIUM SERPL-MCNC: 9.5 MG/DL (ref 8.7–10.3)
CHLORIDE SERPL-SCNC: 103 MMOL/L (ref 96–106)
CO2 SERPL-SCNC: 21 MMOL/L (ref 20–29)
CREAT SERPL-MCNC: 1.18 MG/DL (ref 0.57–1)
EGFRCR SERPLBLD CKD-EPI 2021: 46 ML/MIN/1.73
GLOBULIN SER CALC-MCNC: 2.2 G/DL (ref 1.5–4.5)
GLUCOSE SERPL-MCNC: 101 MG/DL (ref 70–99)
POTASSIUM SERPL-SCNC: 4.6 MMOL/L (ref 3.5–5.2)
PROT SERPL-MCNC: 6.5 G/DL (ref 6–8.5)
SODIUM SERPL-SCNC: 140 MMOL/L (ref 134–144)
TSH SERPL DL<=0.005 MIU/L-ACNC: 2.79 UIU/ML (ref 0.45–4.5)

## 2024-11-27 DIAGNOSIS — R74.8 ELEVATED CREATINE KINASE LEVEL: Primary | ICD-10-CM

## 2024-12-12 ENCOUNTER — TELEPHONE (OUTPATIENT)
Dept: GASTROENTEROLOGY | Facility: CLINIC | Age: 79
End: 2024-12-12
Payer: MEDICARE

## 2024-12-12 NOTE — TELEPHONE ENCOUNTER
CALLED COMMUNICATED TO PT DR STEIN LEFT EARLY ILL BUT THE PROCEDURE WILL STILL BE DONE BY ONE OF [facility] OTHER PROVIDERS,PT VERBALIZED  UNDERSTANDING ,OK FOR HUB TO RELAY

## 2024-12-13 ENCOUNTER — ANESTHESIA EVENT (OUTPATIENT)
Dept: GASTROENTEROLOGY | Facility: HOSPITAL | Age: 79
End: 2024-12-13
Payer: MEDICARE

## 2024-12-13 ENCOUNTER — HOSPITAL ENCOUNTER (OUTPATIENT)
Facility: HOSPITAL | Age: 79
Setting detail: HOSPITAL OUTPATIENT SURGERY
Discharge: HOME | End: 2024-12-13
Attending: INTERNAL MEDICINE | Admitting: INTERNAL MEDICINE
Payer: MEDICARE

## 2024-12-13 ENCOUNTER — ANESTHESIA (OUTPATIENT)
Dept: GASTROENTEROLOGY | Facility: HOSPITAL | Age: 79
End: 2024-12-13
Payer: MEDICARE

## 2024-12-13 VITALS
DIASTOLIC BLOOD PRESSURE: 84 MMHG | BODY MASS INDEX: 23.54 KG/M2 | HEIGHT: 67 IN | WEIGHT: 150 LBS | SYSTOLIC BLOOD PRESSURE: 124 MMHG | RESPIRATION RATE: 20 BRPM | OXYGEN SATURATION: 99 % | HEART RATE: 73 BPM

## 2024-12-13 DIAGNOSIS — D12.8 ADENOMATOUS RECTAL POLYP: ICD-10-CM

## 2024-12-13 PROCEDURE — 25010000002 LIDOCAINE 2% SOLUTION: Performed by: NURSE ANESTHETIST, CERTIFIED REGISTERED

## 2024-12-13 PROCEDURE — 25810000003 LACTATED RINGERS PER 1000 ML: Performed by: INTERNAL MEDICINE

## 2024-12-13 PROCEDURE — 88305 TISSUE EXAM BY PATHOLOGIST: CPT | Performed by: INTERNAL MEDICINE

## 2024-12-13 PROCEDURE — 45338 SIGMOIDOSCOPY W/TUMR REMOVE: CPT | Performed by: INTERNAL MEDICINE

## 2024-12-13 PROCEDURE — 25010000002 PROPOFOL 200 MG/20ML EMULSION: Performed by: NURSE ANESTHETIST, CERTIFIED REGISTERED

## 2024-12-13 RX ORDER — SODIUM CHLORIDE, SODIUM LACTATE, POTASSIUM CHLORIDE, CALCIUM CHLORIDE 600; 310; 30; 20 MG/100ML; MG/100ML; MG/100ML; MG/100ML
30 INJECTION, SOLUTION INTRAVENOUS CONTINUOUS PRN
Status: DISCONTINUED | OUTPATIENT
Start: 2024-12-13 | End: 2024-12-13 | Stop reason: HOSPADM

## 2024-12-13 RX ORDER — PROPOFOL 10 MG/ML
INJECTION, EMULSION INTRAVENOUS CONTINUOUS PRN
Status: DISCONTINUED | OUTPATIENT
Start: 2024-12-13 | End: 2024-12-13 | Stop reason: SURG

## 2024-12-13 RX ORDER — LIDOCAINE HYDROCHLORIDE 20 MG/ML
INJECTION, SOLUTION INFILTRATION; PERINEURAL AS NEEDED
Status: DISCONTINUED | OUTPATIENT
Start: 2024-12-13 | End: 2024-12-13 | Stop reason: SURG

## 2024-12-13 RX ADMIN — PROPOFOL 180 MCG/KG/MIN: 10 INJECTION, EMULSION INTRAVENOUS at 14:15

## 2024-12-13 RX ADMIN — SODIUM CHLORIDE, POTASSIUM CHLORIDE, SODIUM LACTATE AND CALCIUM CHLORIDE: 600; 310; 30; 20 INJECTION, SOLUTION INTRAVENOUS at 14:12

## 2024-12-13 RX ADMIN — PROPOFOL 50 MG: 10 INJECTION, EMULSION INTRAVENOUS at 14:17

## 2024-12-13 RX ADMIN — LIDOCAINE HYDROCHLORIDE 60 MG: 20 INJECTION, SOLUTION INFILTRATION; PERINEURAL at 14:17

## 2024-12-13 NOTE — ANESTHESIA PREPROCEDURE EVALUATION
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 I have explained the risks of anesthesia including but not limited to dental damage, corneal abrasion, nerve injury, MI, stroke, and death. Questions asked and answered. Anesthetic plan discussed with patient and team as indicated. Patient expressed understanding of the above.  )    Anesthetic plan, risks, benefits, and alternatives have been provided, discussed and informed consent has been obtained with: patient.        CODE STATUS:

## 2024-12-13 NOTE — H&P
No chief complaint on file.      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         Problem List:    Patient Active Problem List   Diagnosis    Awareness of heartbeats    HLD (hyperlipidemia)    OP (osteoporosis)    Avitaminosis D    Plantar fascia syndrome    Adenomatous polyp    Near syncope    Dysphonia    Medicare annual wellness visit, initial    Arthritis    Diverticulosis of large intestine without hemorrhage    Dizziness    Neurofibromatosis    Medicare annual wellness visit, subsequent    Breast cancer screening    Intrinsic eczema    Varicose veins of left lower extremity with inflammation    Chronic right-sided low back pain with right-sided sciatica    Tremors of nervous system    Adenomatous polyp of colon    Family history of GI malignancy    Adenomatous rectal polyp    Healthcare maintenance    Mouth sore    Tremor    Skin lesion of back    Right elbow pain    Eye redness    Conjunctival hyperemia, bilateral       Medical History:    Past Medical History:   Diagnosis Date    Adenomatous polyp     Arthritis     Breast cancer screening 01/22/2020    Cataract     Colon polyp     Diverticular disease     Fatigue     GERD (gastroesophageal reflux disease)     Glaucoma     Headache     History of mononucleosis     Hyperlipidemia     Low back pain     Near syncope     Osteopenia     Tremor     Tremors of nervous system     Wrist pain         Social History:    Social History     Socioeconomic History    Marital status:     Number of children: 1   Tobacco Use    Smoking status: Never    Smokeless tobacco: Never   Vaping Use    Vaping status: Never Used   Substance and Sexual Activity    Alcohol use: Never    Drug use: Never    Sexual activity: Not Currently     Birth control/protection: Post-menopausal       Family History:   Family History   Problem Relation Age of Onset    Atrial fibrillation Mother     Stroke Mother     Vision  loss Mother     Breast cancer Paternal Aunt     Hearing loss Father     Heart disease Father     Heart disease Maternal Grandmother     Malig Hyperthermia Neg Hx        Surgical History:   Past Surgical History:   Procedure Laterality Date    BREAST EXCISIONAL BIOPSY Right     done prior to 2007; benign    BREAST SURGERY      COLONOSCOPY      COLONOSCOPY N/A 4/27/2023    Procedure: COLONOSCOPY into cecum/terminal ileum with polypectomy, apc;  Surgeon: Alvarado Aguilar MD;  Location: [facility] ENDOSCOPY;  Service: Gastroenterology;  Laterality: N/A;  diverticulosis,polyps    ECTROPION REPAIR Left 02/07/2019    Procedure: LEFT LOWER LID EXCISION LESS THAN 1/4 LEFT LOWER LID;  Surgeon: Shawn Richardson MD;  Location:  [facility];  Service: Ophthalmology    EYE SURGERY      cataract    LIPOMA EXCISION      SIGMOIDOSCOPY N/A 12/20/2023    Procedure: FLEXIBLE SIGMOIDOSCOPY with cold biopsies;  Surgeon: Alvarado Aguilar MD;  Location: [facility] ENDOSCOPY;  Service: Gastroenterology;  Laterality: N/A;  Pre: hx rectal polyp  Post: mild mucous change in rectum, otherwise normal to 35cm       No current facility-administered medications for this encounter.    Allergies:   Allergies   Allergen Reactions    Influenza Virus Vaccine Rash    Other Other (See Comments)     Eye drops - preservative? irritation    Wound Dressing Adhesive Rash        The following portions of the patient's history were reviewed by me and updated as appropriate: review of systems, allergies, current medications, past family history, past medical history, past social history, past surgical history and problem list.    There were no vitals filed for this visit.    PHYSICAL EXAM:    CONSTITUTIONAL:  today's vital signs reviewed by me  GASTROINTESTINAL: abdomen is soft nontender nondistended with normal active bowel sounds, no masses are appreciated    Assessment/ Plan  Proceed flexible sigmoidoscopy.    Risks and benefits as well as alternatives  to endoscopic evaluation were explained to the patient and they voiced understanding and wish to proceed.  These risks include but are not limited to the risk of bleeding, perforation, adverse reaction to sedation, and missed lesions.  The patient was given the opportunity to ask questions prior to the endoscopic procedure.

## 2024-12-13 NOTE — ANESTHESIA POSTPROCEDURE EVALUATION
"Patient: Angelina Harris    Procedure Summary       Date: 12/13/24 Room / Location:  [facility] /  [facility]    Anesthesia Start: 1412 Anesthesia Stop: 1428    Procedure: FLEXIBLE SIGMOIDOSCOPY  TO SIGMOID COLON WITH POLYPECTOMY Diagnosis:       Adenomatous rectal polyp      (Adenomatous rectal polyp [D12.8])    Surgeons: Aravind Huynh MD Provider: Abdirahman Garnett MD    Anesthesia Type: MAC ASA Status: 3            Anesthesia Type: MAC    Vitals  Vitals Value Taken Time   /84 12/13/24 1459   Temp     Pulse 71 12/13/24 1501   Resp 20 12/13/24 1459   SpO2 96 % 12/13/24 1501   Vitals shown include unfiled device data.        Post Anesthesia Care and Evaluation    Patient location during evaluation: PACU  Patient participation: complete - patient participated  Level of consciousness: awake and alert  Pain management: adequate    Airway patency: patent  Anesthetic complications: No anesthetic complications  PONV Status: controlled  Cardiovascular status: acceptable and hemodynamically stable  Respiratory status: acceptable  Hydration status: acceptable    Comments: /84   Pulse 73   Resp 20   Ht 170.2 cm (67\")   Wt 68 kg (150 lb)   SpO2 99%   BMI 23.49 kg/m²     "

## 2024-12-13 NOTE — DISCHARGE INSTRUCTIONS
For the next 24 hours patient needs to be with a responsible adult.    For 24 hours DO NOT drive, operate machinery, appliances, drink alcohol, make important decisions or sign legal documents.    Start with a light or bland diet if you are feeling sick to your stomach otherwise advance to regular diet as tolerated.    Follow recommendations on procedure report if provided by your doctor.    Call Dr Aguilar/Lino for problems .    Problems may include but not limited to: large amounts of bleeding, trouble breathing, repeated vomiting, severe unrelieved pain, fever or chills.

## 2024-12-16 LAB
CYTO UR: NORMAL
LAB AP CASE REPORT: NORMAL
PATH REPORT.FINAL DX SPEC: NORMAL
PATH REPORT.GROSS SPEC: NORMAL

## 2024-12-20 DIAGNOSIS — R74.8 ELEVATED CREATINE KINASE LEVEL: ICD-10-CM

## 2024-12-21 LAB
BUN SERPL-MCNC: 21 MG/DL (ref 8–27)
BUN/CREAT SERPL: 23 (ref 12–28)
CALCIUM SERPL-MCNC: 9.3 MG/DL (ref 8.7–10.3)
CHLORIDE SERPL-SCNC: 104 MMOL/L (ref 96–106)
CO2 SERPL-SCNC: 24 MMOL/L (ref 20–29)
CREAT SERPL-MCNC: 0.91 MG/DL (ref 0.57–1)
EGFRCR SERPLBLD CKD-EPI 2021: 63 ML/MIN/1.73
GLUCOSE SERPL-MCNC: 108 MG/DL (ref 70–99)
POTASSIUM SERPL-SCNC: 4.9 MMOL/L (ref 3.5–5.2)
SODIUM SERPL-SCNC: 139 MMOL/L (ref 134–144)

## 2025-04-24 ENCOUNTER — OFFICE VISIT (OUTPATIENT)
Dept: INTERNAL MEDICINE | Facility: CLINIC | Age: 80
End: 2025-04-24
Payer: MEDICARE

## 2025-04-24 ENCOUNTER — HOSPITAL ENCOUNTER (OUTPATIENT)
Dept: GENERAL RADIOLOGY | Facility: HOSPITAL | Age: 80
Discharge: HOME | End: 2025-04-24
Admitting: FAMILY MEDICINE
Payer: MEDICARE

## 2025-04-24 VITALS
HEIGHT: 67 IN | DIASTOLIC BLOOD PRESSURE: 74 MMHG | BODY MASS INDEX: 24.55 KG/M2 | WEIGHT: 156.4 LBS | OXYGEN SATURATION: 97 % | SYSTOLIC BLOOD PRESSURE: 120 MMHG | HEART RATE: 74 BPM | TEMPERATURE: 98.4 F

## 2025-04-24 DIAGNOSIS — M79.604 RIGHT LEG PAIN: Primary | ICD-10-CM

## 2025-04-24 PROCEDURE — 73502 X-RAY EXAM HIP UNI 2-3 VIEWS: CPT

## 2025-04-24 RX ORDER — GABAPENTIN 100 MG/1
100 CAPSULE ORAL 2 TIMES DAILY
Qty: 60 CAPSULE | Refills: 1 | Status: SHIPPED | OUTPATIENT
Start: 2025-04-24

## 2025-04-24 NOTE — PROGRESS NOTES
"Chief Complaint  Leg Pain (Right )    Subjective        Angelina Harris presents to [facility]  Leg Pain   There was no injury mechanism. The pain is present in the right thigh and right hip. The quality of the pain is described as shooting, burning and stabbing. The pain is moderate. The pain has been Intermittent since onset. She reports no foreign bodies present. The symptoms are aggravated by movement. She has tried rest and acetaminophen for the symptoms. The treatment provided mild relief.   Extremity Pain   The pain is present in the right upper leg. The problem has been comes and goes. The quality of the pain is described as burning and shooting. The pain is at a severity of 3/10.       Objective   Vital Signs:  /74   Pulse 74   Temp 98.4 °F (36.9 °C)   Ht 170.2 cm (67\")   Wt 70.9 kg (156 lb 6.4 oz)   SpO2 97%   BMI 24.50 kg/m²   Estimated body mass index is 24.5 kg/m² as calculated from the following:    Height as of this encounter: 170.2 cm (67\").    Weight as of this encounter: 70.9 kg (156 lb 6.4 oz).    BMI is within normal parameters. No other follow-up for BMI required.      Physical Exam  Vitals and nursing note reviewed.   Constitutional:       Appearance: She is not ill-appearing.   Cardiovascular:      Rate and Rhythm: Normal rate.   Pulmonary:      Effort: Pulmonary effort is normal.   Musculoskeletal:      Right hip: Tenderness and bony tenderness present. No crepitus. Decreased range of motion. Normal strength.      Right knee: No bony tenderness. LCL laxity present. No MCL laxity. Normal meniscus.      Left knee: No bony tenderness. No LCL laxity or MCL laxity.Normal meniscus.   Neurological:      Mental Status: She is alert.        Result Review :    Common labs          11/22/2024    13:59 12/20/2024    00:00   Common Labs   Glucose 101  108    BUN 25  21    Creatinine 1.18  0.91    Sodium 140  139    Potassium 4.6  4.9    Chloride 103  104    Calcium " "9.5  9.3    Albumin 4.3     Total Bilirubin 0.4     Alkaline Phosphatase 93     AST (SGOT) 40     ALT (SGPT) 34                 Assessment and Plan   Diagnoses and all orders for this visit:    1. Right leg pain (Primary)  -     gabapentin (NEURONTIN) 100 MG capsule; Take 1 capsule by mouth 2 (Two) Times a Day.  Dispense: 60 capsule; Refill: 1  -     XR Hip With or Without Pelvis 2 - 3 View Right    Tylenol Extra Strength 2 tablets every 12 hours  Results of x-ray  Initiate trial of gabapentin first few days dosing at bedtime only  Follow-up results of x-ray     This patient has a PCP that is the continuing focal point for all health care services, and the patient sees this physician to be evaluated for right leg pain. The inherent complexity that this code () captures is not in the clinical condition itself-- right leg pain --but rather the cognitition of the continued responsibility of being the focal point for all needed services for this patient.\"     Follow Up   Return if symptoms worsen or fail to improve, for Recheck.  Patient was given instructions and counseling regarding her condition or for health maintenance advice. Please see specific information pulled into the AVS if appropriate.             "

## (undated) DEVICE — LN SMPL CO2 SHTRM SD STREAM W/M LUER

## (undated) DEVICE — IMMOB HD UNIV CLR DISP

## (undated) DEVICE — ABSORBABLE SUTURE: Brand: MILD CHROMIC GUT

## (undated) DEVICE — KT ORCA ORCAPOD DISP STRL

## (undated) DEVICE — THE SINGLE USE ETRAP – POLYP TRAP IS USED FOR SUCTION RETRIEVAL OF ENDOSCOPICALLY REMOVED POLYPS.: Brand: ETRAP

## (undated) DEVICE — SUT VIC 5/0 P3 18IN J493G

## (undated) DEVICE — Device: Brand: SPOT EX ENDOSCOPIC TATTOO

## (undated) DEVICE — SNAR POLYP CAPTIVATOR RND STFF 2.4 240CM 10MM 1P/U

## (undated) DEVICE — TUBING, SUCTION, 1/4" X 10', STRAIGHT: Brand: MEDLINE

## (undated) DEVICE — SENSR O2 OXIMAX FNGR A/ 18IN NONSTR

## (undated) DEVICE — GLV SURG BIOGEL SENSR LTX PF SZ7.5

## (undated) DEVICE — CANN O2 ETCO2 FITS ALL CONN CO2 SMPL A/ 7IN DISP LF

## (undated) DEVICE — ELECTRD BLD EZ CLN MOD 2.5IN

## (undated) DEVICE — WIPE INST MEROCEL

## (undated) DEVICE — SUT VIC 6/0 P3 18IN J492G

## (undated) DEVICE — PATIENT RETURN ELECTRODE, SINGLE-USE, CONTACT QUALITY MONITORING, ADULT, WITH 9FT CORD, FOR PATIENTS WEIGING OVER 33LBS. (15KG): Brand: MEGADYNE

## (undated) DEVICE — STERILE COTTON TIP 6IN 10PK: Brand: MEDLINE

## (undated) DEVICE — SINGLE-USE BIOPSY FORCEPS: Brand: RADIAL JAW 4

## (undated) DEVICE — ADAPT CLN BIOGUARD AIR/H2O DISP

## (undated) DEVICE — APC PROBE 2200 A, SINGLE USE OD 2.3MM/6.9FR; L. 2.2M/7.2FT: Brand: ERBE

## (undated) DEVICE — LAB CORP ERYTHROMYCIN 15 MCG

## (undated) DEVICE — THE CARR-LOCKE INJECTION NEEDLE IS A SINGLE USE, DISPOSABLE, FLEXIBLE SHEATH INJECTION NEEDLE USED FOR THE INJECTION OF VARIOUS TYPES OF MEDIA THROUGH FLEXIBLE ENDOSCOPES.

## (undated) DEVICE — TRY SKINPREP DRYPREP

## (undated) DEVICE — CROUCH CORNEAL PROTECTOR: Brand: BAUSCH + LOMB

## (undated) DEVICE — NDL HYPO PRECISIONGLIDE REG 25G 1 1/2

## (undated) DEVICE — SNAR POLYP SENSATION STDOVL 27 240 BX40

## (undated) DEVICE — PK ENT 40

## (undated) DEVICE — SUT GUT PLN FAST ABS 5/0 PC1 18IN 1915G